# Patient Record
Sex: FEMALE | Race: WHITE | NOT HISPANIC OR LATINO | URBAN - METROPOLITAN AREA
[De-identification: names, ages, dates, MRNs, and addresses within clinical notes are randomized per-mention and may not be internally consistent; named-entity substitution may affect disease eponyms.]

---

## 2022-07-07 ENCOUNTER — INPATIENT (INPATIENT)
Facility: HOSPITAL | Age: 62
LOS: 0 days | Discharge: LEFT BEFORE DISCHARGE | DRG: 552 | End: 2022-07-08
Attending: STUDENT IN AN ORGANIZED HEALTH CARE EDUCATION/TRAINING PROGRAM | Admitting: STUDENT IN AN ORGANIZED HEALTH CARE EDUCATION/TRAINING PROGRAM
Payer: SELF-PAY

## 2022-07-07 VITALS
TEMPERATURE: 99 F | DIASTOLIC BLOOD PRESSURE: 104 MMHG | RESPIRATION RATE: 17 BRPM | OXYGEN SATURATION: 98 % | HEART RATE: 81 BPM | SYSTOLIC BLOOD PRESSURE: 108 MMHG

## 2022-07-07 DIAGNOSIS — R20.0 ANESTHESIA OF SKIN: ICD-10-CM

## 2022-07-07 DIAGNOSIS — Z29.9 ENCOUNTER FOR PROPHYLACTIC MEASURES, UNSPECIFIED: ICD-10-CM

## 2022-07-07 DIAGNOSIS — M54.50 LOW BACK PAIN, UNSPECIFIED: ICD-10-CM

## 2022-07-07 DIAGNOSIS — M48.061 SPINAL STENOSIS, LUMBAR REGION WITHOUT NEUROGENIC CLAUDICATION: ICD-10-CM

## 2022-07-07 LAB
ALBUMIN SERPL ELPH-MCNC: 4.1 G/DL — SIGNIFICANT CHANGE UP (ref 3.4–5)
ALP SERPL-CCNC: 60 U/L — SIGNIFICANT CHANGE UP (ref 40–120)
ALT FLD-CCNC: 31 U/L — SIGNIFICANT CHANGE UP (ref 12–42)
ANION GAP SERPL CALC-SCNC: 10 MMOL/L — SIGNIFICANT CHANGE UP (ref 9–16)
AST SERPL-CCNC: 18 U/L — SIGNIFICANT CHANGE UP (ref 15–37)
BASOPHILS # BLD AUTO: 0.06 K/UL — SIGNIFICANT CHANGE UP (ref 0–0.2)
BASOPHILS NFR BLD AUTO: 0.6 % — SIGNIFICANT CHANGE UP (ref 0–2)
BILIRUB SERPL-MCNC: 0.6 MG/DL — SIGNIFICANT CHANGE UP (ref 0.2–1.2)
BUN SERPL-MCNC: 15 MG/DL — SIGNIFICANT CHANGE UP (ref 7–23)
CALCIUM SERPL-MCNC: 9.1 MG/DL — SIGNIFICANT CHANGE UP (ref 8.5–10.5)
CHLORIDE SERPL-SCNC: 104 MMOL/L — SIGNIFICANT CHANGE UP (ref 96–108)
CO2 SERPL-SCNC: 27 MMOL/L — SIGNIFICANT CHANGE UP (ref 22–31)
CREAT SERPL-MCNC: 0.88 MG/DL — SIGNIFICANT CHANGE UP (ref 0.5–1.3)
EGFR: 75 ML/MIN/1.73M2 — SIGNIFICANT CHANGE UP
EOSINOPHIL # BLD AUTO: 0.06 K/UL — SIGNIFICANT CHANGE UP (ref 0–0.5)
EOSINOPHIL NFR BLD AUTO: 0.6 % — SIGNIFICANT CHANGE UP (ref 0–6)
GLUCOSE BLDC GLUCOMTR-MCNC: 91 MG/DL — SIGNIFICANT CHANGE UP (ref 70–99)
GLUCOSE SERPL-MCNC: 99 MG/DL — SIGNIFICANT CHANGE UP (ref 70–99)
HCT VFR BLD CALC: 41.6 % — SIGNIFICANT CHANGE UP (ref 34.5–45)
HGB BLD-MCNC: 13.8 G/DL — SIGNIFICANT CHANGE UP (ref 11.5–15.5)
IMM GRANULOCYTES NFR BLD AUTO: 0.4 % — SIGNIFICANT CHANGE UP (ref 0–1.5)
LYMPHOCYTES # BLD AUTO: 2.46 K/UL — SIGNIFICANT CHANGE UP (ref 1–3.3)
LYMPHOCYTES # BLD AUTO: 26.1 % — SIGNIFICANT CHANGE UP (ref 13–44)
MCHC RBC-ENTMCNC: 29.9 PG — SIGNIFICANT CHANGE UP (ref 27–34)
MCHC RBC-ENTMCNC: 33.2 GM/DL — SIGNIFICANT CHANGE UP (ref 32–36)
MCV RBC AUTO: 90.2 FL — SIGNIFICANT CHANGE UP (ref 80–100)
MONOCYTES # BLD AUTO: 0.79 K/UL — SIGNIFICANT CHANGE UP (ref 0–0.9)
MONOCYTES NFR BLD AUTO: 8.4 % — SIGNIFICANT CHANGE UP (ref 2–14)
NEUTROPHILS # BLD AUTO: 6.01 K/UL — SIGNIFICANT CHANGE UP (ref 1.8–7.4)
NEUTROPHILS NFR BLD AUTO: 63.9 % — SIGNIFICANT CHANGE UP (ref 43–77)
NRBC # BLD: 0 /100 WBCS — SIGNIFICANT CHANGE UP (ref 0–0)
PLATELET # BLD AUTO: 218 K/UL — SIGNIFICANT CHANGE UP (ref 150–400)
POTASSIUM SERPL-MCNC: 4 MMOL/L — SIGNIFICANT CHANGE UP (ref 3.5–5.3)
POTASSIUM SERPL-SCNC: 4 MMOL/L — SIGNIFICANT CHANGE UP (ref 3.5–5.3)
PROT SERPL-MCNC: 7.7 G/DL — SIGNIFICANT CHANGE UP (ref 6.4–8.2)
RBC # BLD: 4.61 M/UL — SIGNIFICANT CHANGE UP (ref 3.8–5.2)
RBC # FLD: 14.5 % — SIGNIFICANT CHANGE UP (ref 10.3–14.5)
SARS-COV-2 RNA SPEC QL NAA+PROBE: SIGNIFICANT CHANGE UP
SODIUM SERPL-SCNC: 141 MMOL/L — SIGNIFICANT CHANGE UP (ref 132–145)
WBC # BLD: 9.42 K/UL — SIGNIFICANT CHANGE UP (ref 3.8–10.5)
WBC # FLD AUTO: 9.42 K/UL — SIGNIFICANT CHANGE UP (ref 3.8–10.5)

## 2022-07-07 PROCEDURE — 99053 MED SERV 10PM-8AM 24 HR FAC: CPT

## 2022-07-07 PROCEDURE — 99285 EMERGENCY DEPT VISIT HI MDM: CPT

## 2022-07-07 PROCEDURE — 72146 MRI CHEST SPINE W/O DYE: CPT | Mod: 26

## 2022-07-07 PROCEDURE — 72131 CT LUMBAR SPINE W/O DYE: CPT | Mod: 26

## 2022-07-07 PROCEDURE — 70450 CT HEAD/BRAIN W/O DYE: CPT | Mod: 26

## 2022-07-07 PROCEDURE — 99222 1ST HOSP IP/OBS MODERATE 55: CPT

## 2022-07-07 PROCEDURE — 99222 1ST HOSP IP/OBS MODERATE 55: CPT | Mod: GC

## 2022-07-07 PROCEDURE — 72148 MRI LUMBAR SPINE W/O DYE: CPT | Mod: 26

## 2022-07-07 RX ORDER — ACETAMINOPHEN 500 MG
650 TABLET ORAL EVERY 6 HOURS
Refills: 0 | Status: DISCONTINUED | OUTPATIENT
Start: 2022-07-07 | End: 2022-07-08

## 2022-07-07 RX ORDER — OXYCODONE HYDROCHLORIDE 5 MG/1
5 TABLET ORAL EVERY 6 HOURS
Refills: 0 | Status: DISCONTINUED | OUTPATIENT
Start: 2022-07-07 | End: 2022-07-08

## 2022-07-07 RX ORDER — DICLOFENAC SODIUM 75 MG/1
50 TABLET, DELAYED RELEASE ORAL
Refills: 0 | Status: DISCONTINUED | OUTPATIENT
Start: 2022-07-07 | End: 2022-07-08

## 2022-07-07 RX ORDER — LANOLIN ALCOHOL/MO/W.PET/CERES
3 CREAM (GRAM) TOPICAL AT BEDTIME
Refills: 0 | Status: DISCONTINUED | OUTPATIENT
Start: 2022-07-07 | End: 2022-07-08

## 2022-07-07 RX ORDER — ONDANSETRON 8 MG/1
4 TABLET, FILM COATED ORAL EVERY 8 HOURS
Refills: 0 | Status: DISCONTINUED | OUTPATIENT
Start: 2022-07-07 | End: 2022-07-08

## 2022-07-07 RX ORDER — GABAPENTIN 400 MG/1
300 CAPSULE ORAL THREE TIMES A DAY
Refills: 0 | Status: DISCONTINUED | OUTPATIENT
Start: 2022-07-07 | End: 2022-07-08

## 2022-07-07 RX ADMIN — GABAPENTIN 300 MILLIGRAM(S): 400 CAPSULE ORAL at 19:07

## 2022-07-07 NOTE — PATIENT PROFILE ADULT - ARRIVAL FROM
From: Diane Ivory  To: Omar Vega  Sent: 12/14/2020 3:30 PM CST  Subject: Lab Test or Test Related Question    Dr Vega     I have finished both tests ; pulmonary function and CT . Please call me with the results when you get them.     Thank you in advance     Diane Ivory   Home

## 2022-07-07 NOTE — H&P ADULT - ASSESSMENT
is a 62 y/o F who presented to the ED w/ lower back pressure and bilateral lower extremity numbness, who is admitted for rule out of spinal cord compression.

## 2022-07-07 NOTE — H&P ADULT - NSICDXFAMILYHX_GEN_ALL_CORE_FT
FAMILY HISTORY:  Mother  Still living? Unknown  FH: atrial fibrillation, Age at diagnosis: Age Unknown  FH: diabetes mellitus, Age at diagnosis: Age Unknown  FH: hypertension, Age at diagnosis: Age Unknown

## 2022-07-07 NOTE — PATIENT PROFILE ADULT - FALL HARM RISK - HARM RISK INTERVENTIONS
Assistance with ambulation/Assistance OOB with selected safe patient handling equipment/Communicate Risk of Fall with Harm to all staff/Discuss with provider need for PT consult/Monitor gait and stability/Reinforce activity limits and safety measures with patient and family/Tailored Fall Risk Interventions/Use of alarms - bed, chair and/or voice tab/Visual Cue: Yellow wristband and red socks/Bed in lowest position, wheels locked, appropriate side rails in place/Call bell, personal items and telephone in reach/Instruct patient to call for assistance before getting out of bed or chair/Non-slip footwear when patient is out of bed/[place] to call system/Physically safe environment - no spills, clutter or unnecessary equipment/Purposeful Proactive Rounding/Room/bathroom lighting operational, light cord in reach

## 2022-07-07 NOTE — ED PROVIDER NOTE - CLINICAL SUMMARY MEDICAL DECISION MAKING FREE TEXT BOX
60yo F no pmh presents with low back pain and progressively worsening BLE numbness and R > L lower ext weakness.  On exam afebrile, VSS, well appearing, with RLE weakness compared to LLE.  decreased sensation throughout BLE.  Abnormal gait.  TTP throughout lumbar spine.  Nml rectal tone.  Concern for nerve root compression given presentation; plan for basic labs, analgesia, transfer for MRI.

## 2022-07-07 NOTE — CONSULT NOTE ADULT - SUBJECTIVE AND OBJECTIVE BOX
NEUROSURGERY CONSULT NOTE   HISTORY OF PRESENT ILLNESS:   Patient is a 60 y/o F w/ no PMH who originally presented to [facility] with 2 weeks of progressive worsening bilateral LE numbness R>L and lower back pain and right leg weakness. Patient states that numbness began when she woke up 2 weeks ago, denies trauma or previous injury. She also endorses increased difficulty with ambulation. 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 Numbness is worse with ambulation and intermittent and she states that its a decreased sensation but can still feel light touch. Patient had a similar episode 3-4 years ago, which lasted for 2 days and went away on its own.    Of note, patient endorses one episode of bowel incontinence on Saturday which she attributes to a meal she had. She states that she had the urge to go and was walking to bathroom but then realized that she had already defecated.     She denies saddle anesthesia, new onset plegia, urinary incontinence, recent fall or trauma, neck pain, arm weakness.        PAST MEDICAL & SURGICAL HISTORY:  No pertinent past medical history      No significant past surgical history        FAMILY HISTORY:  FH: atrial fibrillation (Mother)    FH: diabetes mellitus (Mother)    FH: hypertension (Mother)      Allergies    No Known Allergies    Intolerances        REVIEW OF SYSTEMS  All negative except above HPI     MEDICATIONS:  Antibiotics:    Neuro:  acetaminophen     Tablet .. 650 milliGRAM(s) Oral every 6 hours PRN  melatonin 3 milliGRAM(s) Oral at bedtime PRN  ondansetron Injectable 4 milliGRAM(s) IV Push every 8 hours PRN    Anticoagulation:    OTHER:  aluminum hydroxide/magnesium hydroxide/simethicone Suspension 30 milliLiter(s) Oral every 4 hours PRN    IVF:      Vital Signs Last 24 Hrs  T(C): 36.6 (07 Jul 2022 16:12), Max: 37.2 (07 Jul 2022 01:09)  T(F): 97.9 (07 Jul 2022 16:12), Max: 98.9 (07 Jul 2022 01:09)  HR: 62 (07 Jul 2022 16:12) (60 - 81)  BP: 152/88 (07 Jul 2022 16:12) (108/104 - 183/97)  BP(mean): 125 (07 Jul 2022 02:14) (125 - 125)  RR: 18 (07 Jul 2022 16:12) (16 - 18)  SpO2: 97% (07 Jul 2022 16:12) (97% - 99%)    Parameters below as of 07 Jul 2022 16:12  Patient On (Oxygen Delivery Method): room air        PHYSICAL EXAM:  Constitutional:  Eyes:  ENMT:  Neck:  Back:  Respiratory:  Cardiovascular:  Gastrointestinal:  Genitourinary:  Rectal:  Vascular:  Neurological:  Skin:    LABS:                        13.8   9.42  )-----------( 218      ( 07 Jul 2022 03:19 )             41.6     07-07    141  |  104  |  15  ----------------------------<  99  4.0   |  27  |  0.88    Ca    9.1      07 Jul 2022 03:19    TPro  7.7  /  Alb  4.1  /  TBili  0.6  /  DBili  x   /  AST  18  /  ALT  31  /  AlkPhos  60  07-07        CULTURES:      RADIOLOGY & ADDITIONAL STUDIES:    Assessment:      Plan:   NEUROSURGERY CONSULT NOTE   HISTORY OF PRESENT ILLNESS:   Patient is a 62 y/o F w/ no PMH who originally presented to [facility] with 2 weeks of progressive worsening bilateral LE numbness R>L, lower back pain, right leg weakness and difficulty with ambulating. Patient states that numbness began when she woke up 2 weeks ago, denies trauma or previous injury. 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 Numbness is worse with ambulation and intermittent and she states that its a decreased sensation but can still feel light touch. Patient had a similar episode 3-4 years ago, which lasted for 2 days and went away on its own.     Of note, patient endorses one episode of bowel incontinence on Saturday which she attributes to a meal she had. She states that she had the urge to go and was walking to bathroom but then realized that she had already defecated.     She denies saddle anesthesia, new onset plegia, urinary incontinence, recent fall or trauma, neck pain, arm weakness.      Neurosurgery consulted for concern for cord compression.     PAST MEDICAL & SURGICAL HISTORY:  No pertinent past medical history      No significant past surgical history      FAMILY HISTORY:  FH: atrial fibrillation (Mother)    FH: diabetes mellitus (Mother)    FH: hypertension (Mother)      Allergies    No Known Allergies    Intolerances      REVIEW OF SYSTEMS  All negative except above HPI     MEDICATIONS:  Antibiotics:    Neuro:  acetaminophen     Tablet .. 650 milliGRAM(s) Oral every 6 hours PRN  melatonin 3 milliGRAM(s) Oral at bedtime PRN  ondansetron Injectable 4 milliGRAM(s) IV Push every 8 hours PRN    Anticoagulation:    OTHER:  aluminum hydroxide/magnesium hydroxide/simethicone Suspension 30 milliLiter(s) Oral every 4 hours PRN    IVF:      Vital Signs Last 24 Hrs  T(C): 36.6 (07 Jul 2022 16:12), Max: 37.2 (07 Jul 2022 01:09)  T(F): 97.9 (07 Jul 2022 16:12), Max: 98.9 (07 Jul 2022 01:09)  HR: 62 (07 Jul 2022 16:12) (60 - 81)  BP: 152/88 (07 Jul 2022 16:12) (108/104 - 183/97)  BP(mean): 125 (07 Jul 2022 02:14) (125 - 125)  RR: 18 (07 Jul 2022 16:12) (16 - 18)  SpO2: 97% (07 Jul 2022 16:12) (97% - 99%)    Parameters below as of 07 Jul 2022 16:12  Patient On (Oxygen Delivery Method): room air      PHYSICAL EXAM:  General: NAD, pt is comfortably sitting up in bed, on room air  HEENT: CN II-XII grossly intact, PERRL 3mm briskly reactive, EOMI b/l, face symmetric, tongue midline, neck FROM  Cardiovascular: RRR, normal S1 and S2   Respiratory: lungs CTAB, no wheezing, rhonchi, or crackles   GI: normoactive BS to auscultation, abd soft, NTND   Neuro: A&Ox3, No aphasia, speech clear, no dysmetria, no pronator drift. Follows commands.  DUNHAM x4 spontaneously, 5/5 strength in all extremities throughout except right HF 4+/5 in comparison to left HF. Decreased sensation throughout bilateral extremities with no specific dermatomal pattern, +extinction. Right patellar 3+, left patellar 2+, no clonus. Negative saddle anesthesia.  Extremities: distal pulses 2+ x4    LABS:                        13.8   9.42  )-----------( 218      ( 07 Jul 2022 03:19 )             41.6     07-07    141  |  104  |  15  ----------------------------<  99  4.0   |  27  |  0.88    Ca    9.1      07 Jul 2022 03:19    TPro  7.7  /  Alb  4.1  /  TBili  0.6  /  DBili  x   /  AST  18  /  ALT  31  /  AlkPhos  60  07-07        CULTURES:      RADIOLOGY & ADDITIONAL STUDIES:  c< from: CT Lumbar Spine No Cont (07.07.22 @ 06:29) >  FINDINGS:    Mild levocurvature of the lumbar spine with apex at the level of L4-L5.   Trace retrolisthesis of L5 on S1. The vertebral body heights are   maintained. There are multilevel degenerative endplate changes with   osteophyte formation, intervertebral disc space narrowing, vacuum   phenomenon, and endplate irregularity. The osseous structures are intact   without fracture.    At the T12-L1 level, there is no disc herniation. There is no spinal   canal stenosis or neural foramen narrowing.    At the L1-2 level, there is a minimal discbulge and degenerative changes   including facet hypertrophy and ligamentum flavum hypertrophy without   significant spinal canal stenosis or neural foraminal narrowing.    At the L2-3 level, there is a disc bulge and degenerative changes   includingfacet hypertrophy and ligamentum flavum hypertrophy   contributing to mild spinal canal stenosis and bilateral neural foraminal   narrowing.    At the L3-4 level, there is a disc bulge with posterior osseous ridging   asymmetric to the right along with facet and ligamenta flava hypertrophy   resulting in moderate spinal canal stenosis and right lateral recess   narrowing with mild to moderate right and mild left neural foraminal   narrowing.    At the L4-5 level, there is a disc bulge with facet and ligamentum flavum   hypertrophy resulting in mild to moderate spinal canal stenosis with   moderate bilateral neural foraminal narrowing    At the L5-S1 level, there is a disc bulge with bilateral facet   hypertrophy and ligamentum flavum hypertrophy contributing to mild spinal   canal stenosis without neural foraminal narrowing.    IMPRESSION:    No acute fracture or malalignment.    Multilevel degenerative changes most prominent at L3-4 with moderate   spinal canal stenosis and right lateralrecess narrowing with mild to   moderate right greater than left neural foraminal narrowing. Correlation   with patient's symptoms is recommended.    < end of copied text >    < from: CT Head No Cont (07.07.22 @ 10:05) >    Impression:    No acute intracranial hemorrhage, mass effect or demarcated territorial   infarction. .    < end of copied text >      Assessment:  Patient is a 62 y/o F w/ no PMH who originally presented to [facility] with 2 weeks of progressive worsening bilateral LE numbness R>L and lower back pain and right leg weakness. Patient states that numbness began when she woke up 2 weeks ago, denies trauma or previous injury now pending additional workup to rule out cord compression    Plan:  - Please obtain MRI thoracic and lumbar without contrast  - Pain control PRN  - Care per primary team    Please call neurosurgery consult phone at [phone] once imaging obtained.     Assessment and plan discussed with Dr. D'Amico.

## 2022-07-07 NOTE — H&P ADULT - NSHPSOCIALHISTORY_GEN_ALL_CORE
The patient lives alone, works 2 jobs, working 60 hours a week usually on her feet all the time. Patient drinks alcohol 1-2 days per week, 2-3 glasses at a time. Denies any drug use or smoking. The patient lives alone, works 2 jobs, working 60 hours a week usually on her feet all the time. Patient drinks alcohol 1-2 days per week, 2-3 glasses at a time. Denies any drug use or smoking. Lives in a 3rd floor walk up building.

## 2022-07-07 NOTE — ED PROVIDER NOTE - PROGRESS NOTE DETAILS
Discussed case w Dr. Burden from medicine and Dr. D'amico from neurosurg.  Plan for CT L spine- if negative, admit to medicine for MRI.  If positive for obvious surgical issue, admit to neurosurg. I spoke with Dr. berman @ 11am via transfer service and discussed head ct. pt accepted for admission. pt is stable in ER . Called transfer center to speak with Dr Page who was not available. Transfer center called back at 11:35 but Dr Page is still not responding. This matter has been escalated by transfer services at this time. Pt    condition is stable at this time.

## 2022-07-07 NOTE — ED PROVIDER NOTE - OBJECTIVE STATEMENT
62yo F no pmh presents with progressively worsening BLE numbness and R > L lower extremity weakness to the point that she can no longer ambulate without bracing herself against a wall.  No fever, chills, nausea, vomiting, or diarrhea.  Feels pressure in low back but no severe back pain.  No hx of cancer.

## 2022-07-07 NOTE — H&P ADULT - NSHPREVIEWOFSYSTEMS_GEN_ALL_CORE
REVIEW OF SYSTEMS:  CONSTITUTIONAL: No weakness, fevers, chills, changes in weight  EYES/ENT: decreased vision in R eye, patient does not have the proper lenses, clogged R ear  NECK: No pain or stiffness  RESPIRATORY: No cough, wheezing, hemoptysis; No shortness of breath  CARDIOVASCULAR: no chest pain or palpitations  GASTROINTESTINAL: No abdominal or epigastric pain. No nausea, vomiting, or hematemesis; No diarrhea or constipation. No melena or hematochezia.  GENITOURINARY: No dysuria, frequency or hematuria  NEUROLOGICAL: Positive for numbness and weakness, positive for 1 episode bowel incontinence  SKIN: No itching, rashes

## 2022-07-07 NOTE — ED PROVIDER NOTE - PHYSICAL EXAMINATION
Constitutional: awake and alert, in no acute distress  HEENT: head normocephalic and atraumatic. moist mucous membranes  Eyes: extraocular movements intact, normal conjunctiva  Neck: supple, normal ROM  Cardiovascular: regular rate   Pulmonary: no respiratory distress  Gastrointestinal: abdomen flat and nondistended  Rectal: nml tone  Skin: warm, dry, normal for ethnicity  Musculoskeletal: TTP throughout lumbar spine, no edema, no deformity. DP pulses palpable b/l.  Neurological: oriented x4.  RLE strength 4/5 at hip flexion, knee flex/ ext, ankle dorsiflexion.  decreased sensation throughout BLE.  unsteady gait.    Psychiatric: calm and cooperative

## 2022-07-07 NOTE — H&P ADULT - HISTORY OF PRESENT ILLNESS
is a 62 y/o F w/ no PMH who presented to the ED w/ 2 weeks of bilateral LE numbness R>L and lower back pain. Patient states that numbness began when she woke up 2 weeks ago, no inciting events. Numbness characterized by decreased sensation and tingling, moves throughout the entire lower extremities bilaterally, including through the feet. Patient also has lower back pain that she characterizes as a pressure. Numbness is worse with ambulation, comes and goes. Patient had a similar episode 3-4 years ago, which lasted for 2 days and went away on its own. Of note, patient had 1 episode of bowel incontinence on Saturday, where she ate a bad salad, felt that she had to go to the bathroom so tried to get there, but discovered that she had already gone. Patient did not feel herself going to the bathroom during that episode, but usually can feel herself going to the bathroom.     In the ED, vitals include /104, HR 81/min, RR 17/min, O2 sat 98% RA, Temp (F) 98.9. Patient did not receive any fluids or medication. Patient received CT lumbar spine and CT head. Neurosurgery was consulted in ED and recommended admission for MRI evaluation.

## 2022-07-07 NOTE — H&P ADULT - ATTENDING COMMENTS
61F w episode of R>LE weakness several years ago (resolved in 2d), p/w 2wk progressive LBP, BLE numbness, R>LE weakness leading to difficulty ambulating - admitted for eval for cord compression, lumbar radiculopathy, CT lumbar spine shows spinal canal stenosis and L3-4 foraminal narrowing    #R leg weakness - ddx includes lumbar radiculopathy, disk herniation vs neuropathy. MS lower on ddx.   #Ambulatory dysfunction    Plan  Overall, pt has had progressive sacraliliac pain w R>L leg weakness accompanied by numbness throughout the entire leg. No trauma, injury, prior surgeries. Denies saddle anesthesia, change in bowel/bladder habits. Exam notable for 4/5 in R hip flexion. 4/5 in great toe extension. 5/5 in b/l plantarflex/ext. nml sensation b/l but decreased proprioception and vibratory sensation R>L. 3+ in R patella; 2+ in L patella. 2+ in b/l achilles. Nml heel-to-shin - pt is ambulating wo assistance but reports numbness in feet as primary limitation. CT Head negative for acute processes.   MR T+L spine wo contrast  F/U NSG recs  Neuro checks  Start gabapentin 300mg TID; monitor for improvement in pain. May need PT eval in AM    DISPO: Home pending , NSGEOVANNY final recs. Lives in 3 floor walk-up  CODE: FULL. PPx: Pt is ambulatory  Rest per Dr. Darling

## 2022-07-07 NOTE — ED ADULT NURSE NOTE - CHPI ED NUR SYMPTOMS NEG
no change in level of consciousness/no confusion/no dizziness/no fever/no loss of consciousness/no nausea

## 2022-07-07 NOTE — H&P ADULT - PROBLEM SELECTOR PLAN 3
F: PO intake  E: PO intake  N: regular diet  D: not indicated  Dispo: admit to [facility] F: PO intake  E: PO intake  N: regular diet  D: SCDs  Dispo: admit to [facility]. At d/c consider PT and OT consult, set patient up with PCP and PM&R Dr. Bayron Nair    Full code.

## 2022-07-07 NOTE — H&P ADULT - NSHPLABSRESULTS_GEN_ALL_CORE
LABS:                         13.8   9.42  )-----------( 218      ( 07 Jul 2022 03:19 )             41.6     07-07    141  |  104  |  15  ----------------------------<  99  4.0   |  27  |  0.88    Ca    9.1      07 Jul 2022 03:19    TPro  7.7  /  Alb  4.1  /  TBili  0.6  /  DBili  x   /  AST  18  /  ALT  31  /  AlkPhos  60  07-07                  RADIOLOGY, EKG & ADDITIONAL TESTS: Reviewed.

## 2022-07-07 NOTE — ED ADULT NURSE REASSESSMENT NOTE - NS ED NURSE REASSESS COMMENT FT1
Pt endorsed to me in NAD, has no current c/o pain, but states she is still having a lot of weakness and back pain when walking.

## 2022-07-07 NOTE — ED ADULT TRIAGE NOTE - CHIEF COMPLAINT QUOTE
Pt c/o lower back pressure radiating down BLE's w/ BLE numbness and worsening vision x2 weeks. Pt endorses hx of sciatica and states "I don't have my new contacts so that may be why my vision has been off the past few weeks".

## 2022-07-07 NOTE — H&P ADULT - PROBLEM SELECTOR PLAN 2
Mild lower back pain/pressure, worse with movement    - tylenol q6 PRN for mild pain control  - diclofenac 50 mg BID PRN for moderate pain  - IV tramadol for severe pain Mild lower back pain/pressure, worse with movement    - tylenol q6 PRN for mild pain control  - diclofenac 50 mg BID PRN for moderate pain  - oxycodone IR 5 mg for severe pain

## 2022-07-07 NOTE — H&P ADULT - NSHPPHYSICALEXAM_GEN_ALL_CORE
VITAL SIGNS:  T(C): 36.6 (07-07-22 @ 12:52), Max: 37.2 (07-07-22 @ 01:09)  T(F): 97.9 (07-07-22 @ 12:52), Max: 98.9 (07-07-22 @ 01:09)  HR: 65 (07-07-22 @ 12:52) (60 - 81)  BP: 171/95 (07-07-22 @ 12:52) (108/104 - 183/97)  BP(mean): 125 (07-07-22 @ 02:14) (125 - 125)  RR: 18 (07-07-22 @ 12:52) (16 - 18)  SpO2: 99% (07-07-22 @ 12:52) (97% - 99%)  Wt(kg): --    PHYSICAL EXAM:  Constitutional: WDWN resting comfortably in bed; NAD  Head: NC/AT  Eyes: PER, EOMI, anicteric sclera  ENT: no nasal discharge  Respiratory: CTA B/L; no W/R/R, no retractions  Cardiac: +S1/S2; RRR; no M/R/G  Gastrointestinal: abdomen soft, NT/ND; no rebound or guarding  Back: spine midline, bony tenderness L3-L5  Extremities: WWP, no clubbing or cyanosis; no peripheral edema  Musculoskeletal: NROM x4, all upper extremity strength 5/5, lower extremity strength: R hip flexion and extension 3/5, R knee flexion and extension 4/5, R ankle dorsiflexion and plantarflexion 4/5, L extremity all 5/5. walking with a shuffling, unsteady gait. point tenderness R sacroiliac joint, but no radiation down the leg  Dermatologic: skin warm, dry and intact; no rashes, wounds, or scars  Neurologic: AAOx3; reflexes: R patellar 3+, L patellar 2+, R achilles 1+, L achilles 2+  Psychiatric: affect and characteristics of appearance, verbalizations, behaviors are appropriate VITAL SIGNS:  T(C): 36.6 (07-07-22 @ 12:52), Max: 37.2 (07-07-22 @ 01:09)  T(F): 97.9 (07-07-22 @ 12:52), Max: 98.9 (07-07-22 @ 01:09)  HR: 65 (07-07-22 @ 12:52) (60 - 81)  BP: 171/95 (07-07-22 @ 12:52) (108/104 - 183/97)  BP(mean): 125 (07-07-22 @ 02:14) (125 - 125)  RR: 18 (07-07-22 @ 12:52) (16 - 18)  SpO2: 99% (07-07-22 @ 12:52) (97% - 99%)  Wt(kg): --    PHYSICAL EXAM:  Constitutional: WDWN resting comfortably in bed; NAD  Head: NC/AT  Eyes: PER, EOMI, anicteric sclera  ENT: no nasal discharge  Respiratory: CTA B/L; no W/R/R, no retractions  Cardiac: +S1/S2; RRR; no M/R/G  Gastrointestinal: abdomen soft, NT/ND; no rebound or guarding  Back: spine midline, bony tenderness L3-L5  Extremities: WWP, no clubbing or cyanosis; no peripheral edema  Musculoskeletal: NROM x4  Dermatologic: skin warm, dry and intact; no rashes, wounds, or scars  Neurologic: AAOx3; reflexes: R patellar 3+, L patellar 2+, R achilles 1+, L achilles 2+. all upper extremity strength 5/5, lower extremity strength: R hip flexion and extension 3/5, R knee flexion and extension 4/5, R ankle dorsiflexion and plantarflexion 4/5, L extremity all 5/5. walking with a shuffling, unsteady gait. point tenderness R sacroiliac joint, but no radiation down the leg. no vibration sense from the knees down bilaterally, decreased proprioception in toes bilaterally. normal sensation to fine touch  Psychiatric: affect and characteristics of appearance, verbalizations, behaviors are appropriate

## 2022-07-07 NOTE — ED ADULT NURSE NOTE - OBJECTIVE STATEMENT
lower back pain/ pressure radiating down BLE's with numbness and worsening vision x2 weeks- states does not have contacts. Pt endorses hx of sciatica

## 2022-07-07 NOTE — ED PROVIDER NOTE - NS ED ROS FT
Constitutional:  No fever, No chills, No night sweats  Eyes:  No visual changes, No discharge, No redness  ENMT:  No epistaxis, no nasal congestion, no throat pain, no difficulty swallowing  CV:  No chest pain, No palpitations, No peripheral edema  Resp:  No cough, No shortness of breath  GI:  No abdominal pain, No vomiting, No diarrhea  MSK:  No neck pain or stiffness, No joint swelling or pain, +back pain  Neuro: no loss of consciousness, no gait abnormality, no headache, +sensory deficits, and +weakness.  Skin:  No abrasions, no lesions, no rashes  Psych:  No known mental health issues

## 2022-07-08 ENCOUNTER — TRANSCRIPTION ENCOUNTER (OUTPATIENT)
Age: 62
End: 2022-07-08

## 2022-07-08 VITALS
TEMPERATURE: 98 F | SYSTOLIC BLOOD PRESSURE: 150 MMHG | DIASTOLIC BLOOD PRESSURE: 79 MMHG | OXYGEN SATURATION: 96 % | HEART RATE: 70 BPM | RESPIRATION RATE: 18 BRPM

## 2022-07-08 PROBLEM — Z00.00 ENCOUNTER FOR PREVENTIVE HEALTH EXAMINATION: Status: ACTIVE | Noted: 2022-07-08

## 2022-07-08 LAB
ANION GAP SERPL CALC-SCNC: 11 MMOL/L — SIGNIFICANT CHANGE UP (ref 5–17)
BUN SERPL-MCNC: 11 MG/DL — SIGNIFICANT CHANGE UP (ref 7–23)
CALCIUM SERPL-MCNC: 8.9 MG/DL — SIGNIFICANT CHANGE UP (ref 8.4–10.5)
CHLORIDE SERPL-SCNC: 104 MMOL/L — SIGNIFICANT CHANGE UP (ref 96–108)
CO2 SERPL-SCNC: 26 MMOL/L — SIGNIFICANT CHANGE UP (ref 22–31)
CREAT SERPL-MCNC: 0.79 MG/DL — SIGNIFICANT CHANGE UP (ref 0.5–1.3)
EGFR: 85 ML/MIN/1.73M2 — SIGNIFICANT CHANGE UP
GLUCOSE SERPL-MCNC: 100 MG/DL — HIGH (ref 70–99)
HCT VFR BLD CALC: 42.1 % — SIGNIFICANT CHANGE UP (ref 34.5–45)
HCV AB S/CO SERPL IA: 0.04 S/CO — SIGNIFICANT CHANGE UP
HCV AB SERPL-IMP: SIGNIFICANT CHANGE UP
HGB BLD-MCNC: 13.9 G/DL — SIGNIFICANT CHANGE UP (ref 11.5–15.5)
MCHC RBC-ENTMCNC: 30 PG — SIGNIFICANT CHANGE UP (ref 27–34)
MCHC RBC-ENTMCNC: 33 GM/DL — SIGNIFICANT CHANGE UP (ref 32–36)
MCV RBC AUTO: 90.7 FL — SIGNIFICANT CHANGE UP (ref 80–100)
NRBC # BLD: 0 /100 WBCS — SIGNIFICANT CHANGE UP (ref 0–0)
PLATELET # BLD AUTO: 221 K/UL — SIGNIFICANT CHANGE UP (ref 150–400)
POTASSIUM SERPL-MCNC: 3.6 MMOL/L — SIGNIFICANT CHANGE UP (ref 3.5–5.3)
POTASSIUM SERPL-SCNC: 3.6 MMOL/L — SIGNIFICANT CHANGE UP (ref 3.5–5.3)
RBC # BLD: 4.64 M/UL — SIGNIFICANT CHANGE UP (ref 3.8–5.2)
RBC # FLD: 14.6 % — HIGH (ref 10.3–14.5)
SODIUM SERPL-SCNC: 141 MMOL/L — SIGNIFICANT CHANGE UP (ref 135–145)
WBC # BLD: 6.7 K/UL — SIGNIFICANT CHANGE UP (ref 3.8–10.5)
WBC # FLD AUTO: 6.7 K/UL — SIGNIFICANT CHANGE UP (ref 3.8–10.5)

## 2022-07-08 PROCEDURE — 99232 SBSQ HOSP IP/OBS MODERATE 35: CPT

## 2022-07-08 PROCEDURE — 80053 COMPREHEN METABOLIC PANEL: CPT

## 2022-07-08 PROCEDURE — 82962 GLUCOSE BLOOD TEST: CPT

## 2022-07-08 PROCEDURE — 99239 HOSP IP/OBS DSCHRG MGMT >30: CPT | Mod: GC

## 2022-07-08 PROCEDURE — 86803 HEPATITIS C AB TEST: CPT

## 2022-07-08 PROCEDURE — 80048 BASIC METABOLIC PNL TOTAL CA: CPT

## 2022-07-08 PROCEDURE — 72146 MRI CHEST SPINE W/O DYE: CPT

## 2022-07-08 PROCEDURE — 87635 SARS-COV-2 COVID-19 AMP PRB: CPT

## 2022-07-08 PROCEDURE — 36415 COLL VENOUS BLD VENIPUNCTURE: CPT

## 2022-07-08 PROCEDURE — 85027 COMPLETE CBC AUTOMATED: CPT

## 2022-07-08 PROCEDURE — 85025 COMPLETE CBC W/AUTO DIFF WBC: CPT

## 2022-07-08 PROCEDURE — 70450 CT HEAD/BRAIN W/O DYE: CPT

## 2022-07-08 PROCEDURE — 99285 EMERGENCY DEPT VISIT HI MDM: CPT | Mod: 25

## 2022-07-08 PROCEDURE — 99223 1ST HOSP IP/OBS HIGH 75: CPT

## 2022-07-08 PROCEDURE — 72148 MRI LUMBAR SPINE W/O DYE: CPT

## 2022-07-08 PROCEDURE — 72131 CT LUMBAR SPINE W/O DYE: CPT

## 2022-07-08 RX ORDER — GABAPENTIN 400 MG/1
1 CAPSULE ORAL
Qty: 0 | Refills: 0 | DISCHARGE
Start: 2022-07-08

## 2022-07-08 RX ADMIN — Medication 650 MILLIGRAM(S): at 03:58

## 2022-07-08 RX ADMIN — Medication 650 MILLIGRAM(S): at 04:58

## 2022-07-08 NOTE — DISCHARGE NOTE PROVIDER - ATTENDING DISCHARGE PHYSICAL EXAMINATION:
I have read and agree with the resident Discharge Note above.  Patient seen and discussed with resident team on the day of discharge.     Briefly, 62 y/o F w/ no PMH who presented to the ED w/ 2 weeks of bilateral LE numbness R>L and R hip weakness.  No cord compression on MRI L spine but MRI T spine concernign for T2-T3 demylinating process vs syrinx.  Neurology consulted given MR finding and prior episode of weakness concerning for MS vs demylinating process and plan for MR brain andMR whole spine with contrast to diagnose and determine need for treatment.  Patient refused remainder of workup and left AMA.   Able to demonstrate capacity and understands the risk of worsening neurologic condition, scheduled for neuro outpatient follow up and stressed importance of follow up    Attending exam on day of discharge:   Gen: NAD, lying comfortably in bed  HEENT: NCAT, MMM, clear OP  Neck: supple, trachea at midline  CV: RRR, no m/g/r appreciated  Pulm: CTA B, no w/r/r, normal work of breathing  Abd: +BS, soft, NTND  : deferred  Skin: no rashes, ulcers, jaundice; intact, warm and dry  Ext: WWP, no c/c/e  MSK: 5/5 strength b/l UEs, 4/5 R hip flexion 5/5 L hip flexion and decreased sensation in b/l LEs, normal range of motion, no swollen or erythematous joints  Neuro: AOx3, no change from baseline  Psych: pleasant, conversant and appropriate    I was physically present for the evaluation and management services provided. I agree with the included history, physical, and plan which I reviewed and edited where appropriate. I spent > 30 minutes with the patient and the patient's family on direct patient care and discharge planning with more than 50% of the visit spent on counseling and/or coordination of care.     Nadir Rojas  [phone]

## 2022-07-08 NOTE — PROGRESS NOTE ADULT - PROBLEM SELECTOR PLAN 1
CT lumbar spine significant for spinal stenosis L3-L4 and degenerative changes, however patient's symptoms are not localized to a specific spinal cord level. Concern for multilevel spinal stenosis, need to rule out cauda equina and spinal cord compression due to episode of bowel incontinence. Other things lower on the differential to be considered include multiple sclerosis, transverse myelitis.     - MRI w/o contrast thoracic and lumbar spine  - consulted neurosurgery, appreciate recs  - gabapentin 300 mg tonight, see how patient tolerates, continue gabapentin 300 mg TID  - serial neuro exams q8, observe for any acute changes to neuro exam  - for spinal stenosis after r/o of cord compression, consider antiinflammatory treatment such as steroid course, gabapentin 300 mg TID, and outpatient PT referral  - if MRI negative, consider outpatient neurology work up

## 2022-07-08 NOTE — DISCHARGE NOTE PROVIDER - CARE PROVIDER_API CALL
D'amico, Randy  [address] [place], [place] [address]  Phone: [phone]  Fax: (   )    -  Follow Up Time: 1 week   Park iHllman)  Neurology  [address] [place] [address]  Phone: [phone]  Fax: [phone]  Scheduled Appointment: 09/07/2022 02:00 PM

## 2022-07-08 NOTE — DISCHARGE NOTE PROVIDER - NSDCCPTREATMENT_GEN_ALL_CORE_FT
PRINCIPAL PROCEDURE  Procedure: MRI thoracic spine  Findings and Treatment: The MRI of your thoracic spine showed an area of abnormal signaling called a "demyelinating plaque." This is something you should follow up with your neurosurgery team.         SECONDARY PROCEDURE  Procedure: MRI lumbar spine wo contrast  Findings and Treatment: The MRI of your lumbar spine (lower back) showed degenerative changes most prominent at L3-4 with some narrowing of the canal where the nerves exit the spine. This may correlate with the weakness and numbness you're experiencing.    Procedure: CT head wo con  Findings and Treatment: The CT scan we did of your head showed that there was no problem with your brain or skull.    Procedure: CT lumbar spine wo con  Findings and Treatment: The CT scan we did on your lower back showed degenerative changes most prominent at L3-4 with some narrowing of the canal where the nerves exit the spine. This may correlate with the weakness and numbness you're experiencing.     PRINCIPAL PROCEDURE  Procedure: MRI thoracic spine  Findings and Treatment: The MRI of your thoracic spine showed an area of abnormal signaling called a "demyelinating plaque." This is something you should follow up with a neurologist for.      SECONDARY PROCEDURE  Procedure: MRI lumbar spine wo contrast  Findings and Treatment: The MRI of your lumbar spine (lower back) showed degenerative changes most prominent at L3-4 with some narrowing of the canal where the nerves exit the spine. This may correlate with the weakness and numbness you're experiencing. You should follow up with a neurologist to discuss this further.    Procedure: CT head wo con  Findings and Treatment: The CT scan we did of your head showed that there was no problem with your brain or skull.    Procedure: CT lumbar spine wo con  Findings and Treatment: The CT scan we did on your lower back showed degenerative changes most prominent at L3-4 with some narrowing of the canal where the nerves exit the spine. This may correlate with the weakness and numbness you're experiencing.

## 2022-07-08 NOTE — DISCHARGE NOTE NURSING/CASE MANAGEMENT/SOCIAL WORK - NSDCFUADDAPPT_GEN_ALL_CORE_FT
Please follow up with Dr. Hillman from neurology about the results of your scans. We have arranged an appointment for you. Your appointment is on September 7th at 2pm with Dr. Hillman. We would like you to see him within 1-2 weeks of discharge, the office staff will call you to arrange for a possible earlier appoinment.

## 2022-07-08 NOTE — CONSULT NOTE ADULT - SUBJECTIVE AND OBJECTIVE BOX
Neurology consult    SHEELA LY61yFemale    HPI:   is a 62 y/o F w/ no PMH who presented to the ED w/ 2 weeks of bilateral LE numbness R>L and lower back pain. Patient states that numbness began when she woke up 2 weeks ago, no inciting events. Numbness characterized by decreased sensation and tingling, moves throughout the entire lower extremities bilaterally, including through the feet. Patient also has lower back pain that she characterizes as a pressure. Numbness is worse with ambulation, comes and goes. Patient had a similar episode 3-4 years ago, which lasted for 2 days and went away on its own. Of note, patient had 1 episode of bowel incontinence on Saturday, where she ate a bad salad, felt that she had to go to the bathroom so tried to get there, but discovered that she had already gone. Patient did not feel herself going to the bathroom during that episode, but usually can feel herself going to the bathroom.   MEDICATIONS    acetaminophen     Tablet .. 650 milliGRAM(s) Oral every 6 hours PRN  aluminum hydroxide/magnesium hydroxide/simethicone Suspension 30 milliLiter(s) Oral every 4 hours PRN  diclofenac 50 milliGRAM(s) Oral two times a day PRN  gabapentin 300 milliGRAM(s) Oral three times a day  melatonin 3 milliGRAM(s) Oral at bedtime PRN  ondansetron Injectable 4 milliGRAM(s) IV Push every 8 hours PRN  oxyCODONE    IR 5 milliGRAM(s) Oral every 6 hours PRN         Family history: No history of dementia, strokes, or seizures   FAMILY HISTORY:  FH: atrial fibrillation (Mother)    FH: diabetes mellitus (Mother)    FH: hypertension (Mother)      SOCIAL HISTORY -- No history of tobacco or alcohol use     Allergies    No Known Allergies    Intolerances            Vital Signs Last 24 Hrs  T(C): 36.8 (08 Jul 2022 10:07), Max: 36.8 (08 Jul 2022 06:30)  T(F): 98.2 (08 Jul 2022 10:07), Max: 98.2 (08 Jul 2022 06:30)  HR: 70 (08 Jul 2022 10:07) (62 - 71)  BP: 150/79 (08 Jul 2022 10:07) (150/79 - 174/96)  BP(mean): --  RR: 18 (08 Jul 2022 10:07) (17 - 18)  SpO2: 96% (08 Jul 2022 10:07) (96% - 99%)    Parameters below as of 08 Jul 2022 10:07  Patient On (Oxygen Delivery Method): room air          REVIEW OF SYSTEMS:    Constitutional: No fever, chills, fatigue, weakness  Eyes: no eye pain, visual disturbances, or discharge  ENT:  No difficulty hearing, tinnitus, vertigo; No sinus or throat pain  Neck: No pain or stiffness  Respiratory: No cough, dyspnea, wheezing   Cardiovascular: No chest pain, palpitations,   Gastrointestinal: No abdominal or epigastric pain. No nausea, vomiting  No diarrhea or constipation.   Genitourinary: No dysuria, frequency, hematuria or incontinence  Neurological: No headaches, lightheadedness, vertigo, numbness or tremors  Psychiatric: No depression, anxiety, mood swings or difficulty sleeping  Musculoskeletal: No joint pain or swelling; No muscle, back or extremity pain  Skin: No itching, burning, rashes or lesions   Lymph Nodes: No enlarged glands  Endocrine: No heat or cold intolerance; No hair loss, No h/o diabetes or thyroid dysfunction  Allergy and Immunologic: No hives or eczema    On Neurological Examination:    Mental Status - Patient is alert, awake, oriented X3.   Follows commands well and able to answer questions appropriately. Mood and affect  normal  Speech -   Fluent                         Cranial Nerves - Pupils 3 mm equal and reactive to light,   extraocular eye movements intact.   Motor Exam -   Right upper 5/5  Left upper 5/5  Right lower 4/5 proximal and distal  Left lower 5/5   RLE Hyperreflexia in patella+3, achilles   LLE reflexes +2  RUE reflexs +2  LUE reflexes +2    With stimuli positive movement of all 4 extremities  Muscle tone - is normal all over. No asymmetry is seen.    Sensory  diminished vibration sense from the knees down bilaterally, decreased proprioception in toes bilaterally. normal sensation to fine touch  Gait -  unsteady narrow stance on heels     GENERAL Exam:No Acute Distress   HEENT:  normocephalic, atraumatic  LUNGS:	Clear bilaterally  No Wheeze  Decreased bilaterally  HEART:	 Normal S1S2   No murmur RRR        GI/ ABDOMEN:  Soft  Non tender  EXTREMITIES:   No Edema  No Clubbing  No Cyanosis No Edema  MUSCULOSKELETAL: Normal Range of Motion  SKIN:      Normal   No Ecchymosis               LABS:  CBC Full  -  ( 08 Jul 2022 08:16 )  WBC Count : 6.70 K/uL  RBC Count : 4.64 M/uL  Hemoglobin : 13.9 g/dL  Hematocrit : 42.1 %  Platelet Count - Automated : 221 K/uL  Mean Cell Volume : 90.7 fl  Mean Cell Hemoglobin : 30.0 pg  Mean Cell Hemoglobin Concentration : 33.0 gm/dL  Auto Neutrophil # : x  Auto Lymphocyte # : x  Auto Monocyte # : x  Auto Eosinophil # : x  Auto Basophil # : x  Auto Neutrophil % : x  Auto Lymphocyte % : x  Auto Monocyte % : x  Auto Eosinophil % : x  Auto Basophil % : x      07-08    141  |  104  |  11  ----------------------------<  100<H>  3.6   |  26  |  0.79    Ca    8.9      08 Jul 2022 08:16    TPro  7.7  /  Alb  4.1  /  TBili  0.6  /  DBili  x   /  AST  18  /  ALT  31  /  AlkPhos  60  07-07    Hemoglobin A1C:     LIVER FUNCTIONS - ( 07 Jul 2022 03:19 )  Alb: 4.1 g/dL / Pro: 7.7 g/dL / ALK PHOS: 60 U/L / ALT: 31 U/L / AST: 18 U/L / GGT: x           Vitamin B12         RADIOLOGY    EKG

## 2022-07-08 NOTE — CONSULT NOTE ADULT - ASSESSMENT
61F w/ no PMH who originally presented to [facility] with 2 weeks of progressive worsening bilateral LE numbness R>L and lower back pain and right leg weakness. Admitted w/u of cord compression, lumbar radiculopathy, CT lumbar spine shows spinal canal stenosis and L3-4 foraminal narrowing. Neuorlogy consulted for MR find of plaque T2-3    Assessment    Plan

## 2022-07-08 NOTE — DISCHARGE NOTE PROVIDER - NSDCFUADDAPPT_GEN_ALL_CORE_FT
Please follow up with neurosurgery about the results of your scans. The neurosurgeon who saw you in the hospital was Dr. Randy D'Amico. We have arranged an appointment for you.  His office phone number is [phone]. We would like you to see him within 1-2 weeks of discharge Please follow up with Dr. Hillman from neurology about the results of your scans. We have arranged an appointment for you. Your appointment is on September 7th at 2pm with Dr. Hillman. We would like you to see him within 1-2 weeks of discharge. Please follow up with Dr. Hillman from neurology about the results of your scans. We have arranged an appointment for you. Your appointment is on September 7th at 2pm with Dr. Hillman. We would like you to see him within 1-2 weeks of discharge, the office staff will call you to arrange for a possible earlier appoinment.

## 2022-07-08 NOTE — CHART NOTE - NSCHARTNOTEFT_GEN_A_CORE
Pt was informed that she requires further imaging to make a diagnosis and determine appropriate medical management, however, she would not like to remain in the hospital past 1pm today as she has to go to work. The patient was informed that she risks further progression of potential underlying neurological disease. The patient stated that she understood this risk, however is adamant about leaving because she cannot spend any more time in the hospital.

## 2022-07-08 NOTE — PROGRESS NOTE ADULT - PROBLEM SELECTOR PLAN 3
F: PO intake  E: PO intake  N: regular diet  D: SCDs  Dispo: admit to [facility]. At d/c consider PT and OT consult, set patient up with PCP and PM&R Dr. Bayron Nair    Full code.

## 2022-07-08 NOTE — PROGRESS NOTE ADULT - SUBJECTIVE AND OBJECTIVE BOX
SHEELA LY, 61y, Female  MRN-[id number]  Patient is a 61y old  Female who presents with a chief complaint of numbness (08 Jul 2022 10:35)    OVERNIGHT EVENTS: No acute events.    SUBJECTIVE: Ms. Ly was seen and evaluated at bedside this morning. She denied blurry and double vision, new numbness and weakness, shortness of breath, chest pain, fever/chills, difficulty with chewing and swallowing, further episodes of bladder/bowel incontinence. She stated she was eager to leave and that she would be leaving at 1pm regardless of our recommendations.     12 Point ROS Negative unless noted otherwise above.  -------------------------------------------------------------------------------  VITAL SIGNS:  Vital Signs Last 24 Hrs  T(C): 36.8 (08 Jul 2022 10:07), Max: 36.8 (08 Jul 2022 06:30)  T(F): 98.2 (08 Jul 2022 10:07), Max: 98.2 (08 Jul 2022 06:30)  HR: 70 (08 Jul 2022 10:07) (62 - 71)  BP: 150/79 (08 Jul 2022 10:07) (150/79 - 174/96)  BP(mean): --  RR: 18 (08 Jul 2022 10:07) (17 - 18)  SpO2: 96% (08 Jul 2022 10:07) (96% - 99%)    Parameters below as of 08 Jul 2022 10:07  Patient On (Oxygen Delivery Method): room air    PHYSICAL EXAM:  General: NAD, resting comfortably in bed   HEENT: NC/AT; EOMI, anicteric sclera; moist mucosal membranes.  Neck: supple, trachea midline  Cardiovascular: RRR, +S1/S2; NO M/R/G  Respiratory: CTA B/L; no W/R/R  Gastrointestinal: soft, NT/ND; +BSx4  Extremities: WWP; no edema or cyanosis  Vascular: 2+ radial, DP/PT pulses B/L  Neurological: AAOx3; patient declined neuro exam    ALLERGIES:  No Known Allergies    MEDICATIONS  (STANDING):  gabapentin 300 milliGRAM(s) Oral three times a day    MEDICATIONS  (PRN):  acetaminophen     Tablet .. 650 milliGRAM(s) Oral every 6 hours PRN Temp greater or equal to 38C (100.4F), Mild Pain (1 - 3)  aluminum hydroxide/magnesium hydroxide/simethicone Suspension 30 milliLiter(s) Oral every 4 hours PRN Dyspepsia  diclofenac 50 milliGRAM(s) Oral two times a day PRN Moderate Pain (4 - 6)  melatonin 3 milliGRAM(s) Oral at bedtime PRN Insomnia  ondansetron Injectable 4 milliGRAM(s) IV Push every 8 hours PRN Nausea and/or Vomiting  oxyCODONE    IR 5 milliGRAM(s) Oral every 6 hours PRN Severe Pain (7 - 10)  -------------------------------------------------------------------------------  LABS:                        13.9   6.70  )-----------( 221      ( 08 Jul 2022 08:16 )             42.1     07-08    141  |  104  |  11  ----------------------------<  100<H>  3.6   |  26  |  0.79    Ca    8.9      08 Jul 2022 08:16    TPro  7.7  /  Alb  4.1  /  TBili  0.6  /  DBili  x   /  AST  18  /  ALT  31  /  AlkPhos  60  07-07    LIVER FUNCTIONS - ( 07 Jul 2022 03:19 )  Alb: 4.1 g/dL / Pro: 7.7 g/dL / ALK PHOS: 60 U/L / ALT: 31 U/L / AST: 18 U/L / GGT: x               CAPILLARY BLOOD GLUCOSE  POCT Blood Glucose.: 91 mg/dL (07 Jul 2022 01:48)    COVID-19 PCR: NotDetec (07 Jul 2022 03:19)  RADIOLOGY & ADDITIONAL TESTS: Reviewed.

## 2022-07-08 NOTE — DISCHARGE NOTE PROVIDER - NSDCFUSCHEDAPPT_GEN_ALL_CORE_FT
Park Hillman  [facility]  NEUROLOGY [address]  Scheduled Appointment: 09/07/2022

## 2022-07-08 NOTE — DISCHARGE NOTE NURSING/CASE MANAGEMENT/SOCIAL WORK - PATIENT PORTAL LINK FT
You can access the FollowMyHealth Patient Portal offered by [facility] by registering at the following website: [email or web address]. By joining [employer]’s FollowMyHealth portal, you will also be able to view your health information using other applications (apps) compatible with our system.

## 2022-07-08 NOTE — DISCHARGE NOTE PROVIDER - PROVIDER TOKENS
FREE:[LAST:[SHAWN'amico],FIRST:[Damien],PHONE:[phone]],FAX:[(   [phone]    -],ADDRESS:[[address] [place] [address]],FOLLOWUP:[1 week]] PROVIDER:[TOKEN:[[id number]],SCHEDULEDAPPT:[09/07/2022],SCHEDULEDAPPTTIME:[02:00 PM]]

## 2022-07-08 NOTE — DISCHARGE NOTE PROVIDER - CARE PROVIDERS DIRECT ADDRESSES
,DirectAddress_Unknown ,[email or web address]

## 2022-07-08 NOTE — PROGRESS NOTE ADULT - SUBJECTIVE AND OBJECTIVE BOX
HPI:  ROSSY is a 60 y/o F w/ no PMH who presented to the ED w/ 2 weeks of bilateral LE numbness R>L and lower back pain. Patient states that numbness began when she woke up 2 weeks ago, no inciting events. Numbness characterized by decreased sensation and tingling, moves throughout the entire lower extremities bilaterally, including through the feet. Patient also has lower back pain that she characterizes as a pressure. Numbness is worse with ambulation, comes and goes. Patient had a similar episode 3-4 years ago, which lasted for 2 days and went away on its own. Of note, patient had 1 episode of bowel incontinence on Saturday, where she ate a bad salad, felt that she had to go to the bathroom so tried to get there, but discovered that she had already gone. Patient did not feel herself going to the bathroom during that episode, but usually can feel herself going to the bathroom.     In the ED, vitals include /104, HR 81/min, RR 17/min, O2 sat 98% RA, Temp (F) 98.9. Patient did not receive any fluids or medication. Patient received CT lumbar spine and CT head. Neurosurgery was consulted in ED and recommended admission for MRI evaluation.  (07 Jul 2022 14:49)    Vital Signs Last 24 Hrs  T(C): 36.8 (08 Jul 2022 06:30), Max: 36.8 (08 Jul 2022 06:30)  T(F): 98.2 (08 Jul 2022 06:30), Max: 98.2 (08 Jul 2022 06:30)  HR: 71 (08 Jul 2022 06:30) (60 - 71)  BP: 155/84 (08 Jul 2022 06:30) (152/88 - 174/96)  BP(mean): --  RR: 17 (08 Jul 2022 06:30) (16 - 18)  SpO2: 97% (08 Jul 2022 06:30) (97% - 99%)    Parameters below as of 08 Jul 2022 06:30  Patient On (Oxygen Delivery Method): room air        I&O's Summary      PHYSICAL EXAM:  GEN: laying in bed, appears well, NAD  NEURO: AOx3. FC, OE spont, speech intact, face symmetric. CNII-XII intact. PERRL, EOMI. No pronator drift. MAEx4. 5/5 strength throughout. RLE hyperreflexic patella 3+. no clonus. decreased sensation throughout b/l LEs, no dermatomal pattern.  CV: RRR +S1/S2  PULM: CTAB  GI: Abd soft, NT/ND  EXT: ext warm, dry, nontender      TUBES/LINES:  [] De Los Santos  [] Lumbar Drain  [] Wound Drains  [] Others      DIET:  [] NPO  [] Mechanical  [] Tube feeds    LABS:                        13.9   6.70  )-----------( 221      ( 08 Jul 2022 08:16 )             42.1     07-08    141  |  104  |  x   ----------------------------<  100<H>  3.6   |  26  |  0.79    Ca    8.9      08 Jul 2022 08:16    TPro  7.7  /  Alb  4.1  /  TBili  0.6  /  DBili  x   /  AST  18  /  ALT  31  /  AlkPhos  60  07-07            CAPILLARY BLOOD GLUCOSE          Drug Levels: [] N/A    CSF Analysis: [] N/A      Allergies    No Known Allergies    Intolerances      MEDICATIONS:  Antibiotics:    Neuro:  acetaminophen     Tablet .. 650 milliGRAM(s) Oral every 6 hours PRN  diclofenac 50 milliGRAM(s) Oral two times a day PRN  gabapentin 300 milliGRAM(s) Oral three times a day  melatonin 3 milliGRAM(s) Oral at bedtime PRN  ondansetron Injectable 4 milliGRAM(s) IV Push every 8 hours PRN  oxyCODONE    IR 5 milliGRAM(s) Oral every 6 hours PRN    Anticoagulation:    OTHER:  aluminum hydroxide/magnesium hydroxide/simethicone Suspension 30 milliLiter(s) Oral every 4 hours PRN    IVF:    CULTURES:    RADIOLOGY & ADDITIONAL TESTS:  MR T spine 7/7/22  1. 5 x 5 x 19 mm area of abnormal increased T2 signal within the central  portion of the spinalcord at the T2-3 level. This is concerning for a  demyelinating plaque. A syrinx versus astrocytoma is within the   differential  but felt to be much less likely.  2. The remainder of the examination is normal.  There are no significant  degenerativechanges.        ASSESSMENT:  Patient is a 60 y/o F w/ no PMH who originally presented to [facility] with 2 weeks of progressive worsening bilateral LE numbness R>L and lower back pain and right leg weakness. Patient states that numbness began when she woke up 2 weeks ago, denies trauma or previous injury now pending additional workup to rule out cord compression    PLAN:  - MRI showing concern for demyelinating process at T2-3, recommend MRI T/L spine w/ contrast for further evaluation  - Recommend neurology consult  - Continue care as per primary team     Discussed with Dr. D'Amico   HPI:  ROSSY is a 60 y/o F w/ no PMH who presented to the ED w/ 2 weeks of bilateral LE numbness R>L and lower back pain. Patient states that numbness began when she woke up 2 weeks ago, no inciting events. Numbness characterized by decreased sensation and tingling, moves throughout the entire lower extremities bilaterally, including through the feet. Patient also has lower back pain that she characterizes as a pressure. Numbness is worse with ambulation, comes and goes. Patient had a similar episode 3-4 years ago, which lasted for 2 days and went away on its own. Of note, patient had 1 episode of bowel incontinence on Saturday, where she ate a bad salad, felt that she had to go to the bathroom so tried to get there, but discovered that she had already gone. Patient did not feel herself going to the bathroom during that episode, but usually can feel herself going to the bathroom.     In the ED, vitals include /104, HR 81/min, RR 17/min, O2 sat 98% RA, Temp (F) 98.9. Patient did not receive any fluids or medication. Patient received CT lumbar spine and CT head. Neurosurgery was consulted in ED and recommended admission for MRI evaluation.  (07 Jul 2022 14:49)    Vital Signs Last 24 Hrs  T(C): 36.8 (08 Jul 2022 06:30), Max: 36.8 (08 Jul 2022 06:30)  T(F): 98.2 (08 Jul 2022 06:30), Max: 98.2 (08 Jul 2022 06:30)  HR: 71 (08 Jul 2022 06:30) (60 - 71)  BP: 155/84 (08 Jul 2022 06:30) (152/88 - 174/96)  BP(mean): --  RR: 17 (08 Jul 2022 06:30) (16 - 18)  SpO2: 97% (08 Jul 2022 06:30) (97% - 99%)    Parameters below as of 08 Jul 2022 06:30  Patient On (Oxygen Delivery Method): room air        I&O's Summary      PHYSICAL EXAM:  GEN: laying in bed, appears well, NAD  NEURO: AOx3. FC, OE spont, speech intact, face symmetric. CNII-XII intact. PERRL, EOMI. No pronator drift. MAEx4. 5/5 strength throughout. RLE hyperreflexic patella 3+. no clonus. decreased sensation throughout b/l LEs, no dermatomal pattern.  CV: RRR +S1/S2  PULM: CTAB  GI: Abd soft, NT/ND  EXT: ext warm, dry, nontender      TUBES/LINES:  [] De Los Santos  [] Lumbar Drain  [] Wound Drains  [] Others      DIET:  [] NPO  [] Mechanical  [] Tube feeds    LABS:                        13.9   6.70  )-----------( 221      ( 08 Jul 2022 08:16 )             42.1     07-08    141  |  104  |  x   ----------------------------<  100<H>  3.6   |  26  |  0.79    Ca    8.9      08 Jul 2022 08:16    TPro  7.7  /  Alb  4.1  /  TBili  0.6  /  DBili  x   /  AST  18  /  ALT  31  /  AlkPhos  60  07-07            CAPILLARY BLOOD GLUCOSE          Drug Levels: [] N/A    CSF Analysis: [] N/A      Allergies    No Known Allergies    Intolerances      MEDICATIONS:  Antibiotics:    Neuro:  acetaminophen     Tablet .. 650 milliGRAM(s) Oral every 6 hours PRN  diclofenac 50 milliGRAM(s) Oral two times a day PRN  gabapentin 300 milliGRAM(s) Oral three times a day  melatonin 3 milliGRAM(s) Oral at bedtime PRN  ondansetron Injectable 4 milliGRAM(s) IV Push every 8 hours PRN  oxyCODONE    IR 5 milliGRAM(s) Oral every 6 hours PRN    Anticoagulation:    OTHER:  aluminum hydroxide/magnesium hydroxide/simethicone Suspension 30 milliLiter(s) Oral every 4 hours PRN    IVF:    CULTURES:    RADIOLOGY & ADDITIONAL TESTS:  MR T spine 7/7/22  1. 5 x 5 x 19 mm area of abnormal increased T2 signal within the central  portion of the spinalcord at the T2-3 level. This is concerning for a  demyelinating plaque. A syrinx versus astrocytoma is within the   differential  but felt to be much less likely.  2. The remainder of the examination is normal.  There are no significant  degenerativechanges.        ASSESSMENT:  Patient is a 60 y/o F w/ no PMH who originally presented to [facility] with 2 weeks of progressive worsening bilateral LE numbness R>L and lower back pain and right leg weakness. Patient states that numbness began when she woke up 2 weeks ago, denies trauma or previous injury now pending additional workup to rule out cord compression    PLAN:  - MRI showing concern for demyelinating process at T2-3, recommend MRI c/t spine w/ contrast for further evaluation  - Recommend neurology consult  - Continue care as per primary team     Discussed with Dr. D'Amico

## 2022-07-08 NOTE — DISCHARGE NOTE PROVIDER - NSDCCPCAREPLAN_GEN_ALL_CORE_FT
PRINCIPAL DISCHARGE DIAGNOSIS  Diagnosis: Back pain  Assessment and Plan of Treatment: You came into the hospital because of back pain and leg weakness/numbness. We did a few scans of your back including a CT scan and an MRI. The CT scan showed narrowing of the nerve canal in L3-L4. This can cause weakness or numbness.  The MRI showed one area of demyelination, which means that the nerves have lost their coating. This can be seen in diseases such as multiple sclerosis. We gave you medication that helps with nerve pain called gabapentin, which we will send you home with. You can take 300mg three times a day.       PRINCIPAL DISCHARGE DIAGNOSIS  Diagnosis: Back pain  Assessment and Plan of Treatment: You came into the hospital because of back pain and leg weakness/numbness. We did a few scans of your back including a CT scan and an MRI. The CT scan showed narrowing of the nerve canal in L3-L4, which means that the nerve may not have enough room to exit the spine. This can cause weakness or numbness in your legs. The MRI showed one area of demyelination, which means that the nerves have lost their coating. This can be seen in a number of conditions, which is why we want you to follow up with a neurologist. While you were in the hospital, we gave you medication that helps with nerve pain called gabapentin, which we will send you home with. You can take 300mg three times a day if it is helping you.       PRINCIPAL DISCHARGE DIAGNOSIS  Diagnosis: Back pain  Assessment and Plan of Treatment: You came into the hospital because of back pain and leg weakness/numbness. We did a few scans of your back including a CT scan and an MRI. The CT scan showed narrowing of the nerve canal in L3-L4, which means that the nerve may not have enough room to exit the spine. This can cause weakness or numbness in your legs. The MRI showed one area of demyelination, which means that the nerves have lost their coating. This can be seen in a number of conditions, which is why we want you to follow up with a neurologist. While you were in the hospital, we gave you medication that helps with nerve pain called gabapentin, which we will send you home with. You can take 300mg three times a day if it is helping you.      SECONDARY DISCHARGE DIAGNOSES  Diagnosis: Numbness of legs  Assessment and Plan of Treatment: You presented with leg numbness. After imaging, it was determined that you had findigns of nerve impingement for which medication was prescibed for symptomatic management. However, you also had findings concerning for another underlying condition which can lead to progressive deteriation of the nervous system and requires further imaging for diagnosis. As you have chosen to leave the hospital against medical advice, you are advised to follow up with an outpatient neurologist.

## 2022-07-08 NOTE — DISCHARGE NOTE PROVIDER - HOSPITAL COURSE
#Discharge: do not delete    Patient is 60 yo F with no past medical history presented with bilateral LE numbness/weakness and low back pain, found to have severe right-sided foraminal stenosis L4-L5 and   an area at T2-T3 concerning for a demyelinating plaque.     Hospital course (by problem):   #Lower extremity weakness  The patient presented with 2 weeks of worsening bilateral LE numbness and low back pain. She underwent CT of the lumbar spine (no contrast) which showed multilevel degenerative changes most prominent at L3-4 with moderate spinal canal stenosis and R lateral recess narrowing with mild-moderate R>L neural foraminal narrowing.     Patient was discharged to: home    New medications:   Changes to old medications:  Medications that were stopped:    Items to follow up as outpatient:    Physical exam at the time of discharge:       #Discharge: do not delete    Patient is 60 yo F with no past medical history presented with bilateral LE numbness/weakness and low back pain, found to have severe right-sided foraminal stenosis L4-L5 and   an area at T2-T3 concerning for a demyelinating plaque.     Hospital course (by problem):   #Lower extremity weakness  The patient presented with 2 weeks of worsening bilateral LE numbness and low back pain. She has had one similar episode about 6 years ago which resolved spontaneously. She underwent CT of the lumbar spine (no contrast) which showed multilevel degenerative changes most prominent at L3-4 with moderate spinal canal stenosis and R lateral recess narrowing with mild-moderate R>L neural foraminal narrowing. Neurosugery recommended MRI spine without contrast which showed an area of abnormal increased T2 signal within the central portion of the spinal cord at the T2-3 level, concerning for a demyelinating plaque. Additional findings included severe R sided foraminal stenosis at L4-L5.     Patient was discharged to: home    New medications: gabapentin 300mg TID  Changes to old medications: none  Medications that were stopped: none    Items to follow up as outpatient:  --MRI results with neurosurgery    Physical exam at the time of discharge:  General: NAD, pt is comfortably resting in bed, on room air  HEENT: CN II-XII grossly intact, PERRL 3mm briskly reactive, EOMI b/l, face symmetric, tongue midline, neck FROM  Cardiovascular: RRR, normal S1 and S2   Respiratory: lungs CTAB, no wheezing, rhonchi, or crackles   GI: normoactive BS to auscultation, abd soft, NTND   Neuro: A&Ox3, No aphasia, speech clear, no dysmetria, no pronator drift. Follows commands.  DUNHAM x4 spontaneously, 5/5 strength in all extremities throughout except right HF 4+/5 in comparison to left HF. Decreased sensation throughout bilateral extremities with no specific dermatomal pattern, +extinction. Right patellar 3+, left patellar 2+, no clonus. Negative saddle anesthesia.  Extremities: distal pulses 2+ x4 #Discharge: do not delete    Patient is 60 yo F with no past medical history presented with bilateral LE numbness/weakness and low back pain, found to have severe right-sided foraminal stenosis L4-L5 and   an area at T2-T3 concerning for a demyelinating plaque.     Hospital course (by problem):   #Lower extremity weakness  The patient presented with 2 weeks of worsening bilateral LE numbness and low back pain. She has had one similar episode about 6 years ago which resolved spontaneously. She underwent CT of the lumbar spine without contrast which showed multilevel degenerative changes most prominent at L3-4 with moderate spinal canal stenosis and R lateral recess narrowing with mild-moderate R>L neural foraminal narrowing. Neurosugery recommended MRI spine without contrast which showed an area of abnormal increased T2 signal within the central portion of the spinal cord at the T2-3 level, concerning for a demyelinating plaque. Additional findings included severe R sided foraminal stenosis at L4-L5. The neurosurgery team recommended follow up with neurology as well as a MRI of the spine with contrast, but the patient requested to leave by 1pm on 7/8. She said she was not interested in physical therapy.     Patient was discharged to: home    New medications: gabapentin 300mg TID  Changes to old medications: none  Medications that were stopped: none    Items to follow up as outpatient:  Neurology: f/u regarding demyelinating plaque and severe foraminal stenosis    Physical exam at the time of discharge:  General: NAD, pt is comfortably resting in bed, on room air  HEENT: CN II-XII grossly intact, PERRL 3mm briskly reactive, EOMI b/l, face symmetric, tongue midline, neck FROM  Cardiovascular: RRR, normal S1 and S2   Respiratory: lungs CTAB, no wheezing, rhonchi, or crackles   GI: normoactive BS to auscultation, abd soft, NTND   Neuro: A&Ox3, No aphasia, speech clear, no dysmetria, no pronator drift. Follows commands.  DUNHAM x4 spontaneously, 5/5 strength in all extremities throughout except right HF 4+/5 in comparison to left HF. Decreased sensation throughout bilateral extremities with no specific dermatomal pattern, +extinction. Right patellar 3+, left patellar 2+, no clonus. Negative saddle anesthesia.  Extremities: distal pulses 2+ x4 #Discharge: do not delete    Patient is 60 yo F with no past medical history presented with bilateral LE numbness/weakness and low back pain, found to have severe right-sided foraminal stenosis L4-L5 and   an area at T2-T3 concerning for a demyelinating plaque.     Hospital course (by problem):   #Lower extremity weakness  The patient presented with 2 weeks of worsening bilateral LE numbness and low back pain. She has had one similar episode about 6 years ago which resolved spontaneously. She underwent CT of the lumbar spine without contrast which showed multilevel degenerative changes most prominent at L3-4 with moderate spinal canal stenosis and R lateral recess narrowing with mild-moderate R>L neural foraminal narrowing. Neurosugery recommended MRI spine without contrast which showed an area of abnormal increased T2 signal within the central portion of the spinal cord at the T2-3 level, concerning for a demyelinating plaque. Additional findings included severe R sided foraminal stenosis at L4-L5. The neurosurgery team recommended follow up with neurology as well as a MRI of the spine with contrast, but the patient requested to leave by 1pm on 7/8. She said she was not interested in physical therapy.   -  Pt opting to leave against medical advice, does not want to get imaging while inpatient  - Instructed to F/U with Outpatient Neurology     Patient was discharged to: home    New medications: gabapentin 300mg TID  Changes to old medications: none  Medications that were stopped: none    Items to follow up as outpatient:  Neurology: f/u regarding demyelinating plaque and severe foraminal stenosis    Physical exam at the time of discharge:  General: NAD, pt is comfortably resting in bed, on room air  HEENT: CN II-XII grossly intact, PERRL 3mm briskly reactive, EOMI b/l, face symmetric, tongue midline, neck FROM  Cardiovascular: RRR, normal S1 and S2   Respiratory: lungs CTAB, no wheezing, rhonchi, or crackles   GI: normoactive BS to auscultation, abd soft, NTND   Neuro: A&Ox3, No aphasia, speech clear, no dysmetria, no pronator drift. Follows commands.  DUNHAM x4 spontaneously, 5/5 strength in all extremities throughout except right HF 4+/5 in comparison to left HF. Decreased sensation throughout bilateral extremities with no specific dermatomal pattern, +extinction. Right patellar 3+, left patellar 2+, no clonus. Negative saddle anesthesia.  Extremities: distal pulses 2+ x4

## 2022-07-08 NOTE — PROGRESS NOTE ADULT - PROBLEM SELECTOR PLAN 2
Mild lower back pain/pressure, worse with movement    - tylenol q6 PRN for mild pain control  - diclofenac 50 mg BID PRN for moderate pain  - oxycodone IR 5 mg for severe pain

## 2022-07-14 DIAGNOSIS — M54.9 DORSALGIA, UNSPECIFIED: ICD-10-CM

## 2022-07-14 DIAGNOSIS — M48.07 SPINAL STENOSIS, LUMBOSACRAL REGION: ICD-10-CM

## 2022-07-14 DIAGNOSIS — Z53.29 PROCEDURE AND TREATMENT NOT CARRIED OUT BECAUSE OF PATIENT'S DECISION FOR OTHER REASONS: ICD-10-CM

## 2022-07-14 DIAGNOSIS — Z83.3 FAMILY HISTORY OF DIABETES MELLITUS: ICD-10-CM

## 2022-07-14 DIAGNOSIS — G37.9 DEMYELINATING DISEASE OF CENTRAL NERVOUS SYSTEM, UNSPECIFIED: ICD-10-CM

## 2022-07-14 DIAGNOSIS — Z20.822 CONTACT WITH AND (SUSPECTED) EXPOSURE TO COVID-19: ICD-10-CM

## 2022-07-14 DIAGNOSIS — Z82.49 FAMILY HISTORY OF ISCHEMIC HEART DISEASE AND OTHER DISEASES OF THE CIRCULATORY SYSTEM: ICD-10-CM

## 2022-07-14 DIAGNOSIS — M51.16 INTERVERTEBRAL DISC DISORDERS WITH RADICULOPATHY, LUMBAR REGION: ICD-10-CM

## 2022-07-18 ENCOUNTER — INPATIENT (INPATIENT)
Facility: HOSPITAL | Age: 62
LOS: 1 days | Discharge: HOME | DRG: 60 | End: 2022-07-20
Attending: PSYCHIATRY & NEUROLOGY | Admitting: PSYCHIATRY & NEUROLOGY
Payer: SELF-PAY

## 2022-07-18 VITALS
TEMPERATURE: 98 F | HEART RATE: 88 BPM | WEIGHT: 186.95 LBS | DIASTOLIC BLOOD PRESSURE: 99 MMHG | OXYGEN SATURATION: 98 % | RESPIRATION RATE: 18 BRPM | SYSTOLIC BLOOD PRESSURE: 193 MMHG

## 2022-07-18 PROBLEM — Z78.9 OTHER SPECIFIED HEALTH STATUS: Chronic | Status: ACTIVE | Noted: 2022-07-07

## 2022-07-18 LAB
ANION GAP SERPL CALC-SCNC: 12 MMOL/L — SIGNIFICANT CHANGE UP (ref 5–17)
BASOPHILS # BLD AUTO: 0.05 K/UL — SIGNIFICANT CHANGE UP (ref 0–0.2)
BASOPHILS NFR BLD AUTO: 0.6 % — SIGNIFICANT CHANGE UP (ref 0–2)
BUN SERPL-MCNC: 16 MG/DL — SIGNIFICANT CHANGE UP (ref 7–23)
CALCIUM SERPL-MCNC: 9 MG/DL — SIGNIFICANT CHANGE UP (ref 8.4–10.5)
CHLORIDE SERPL-SCNC: 103 MMOL/L — SIGNIFICANT CHANGE UP (ref 96–108)
CO2 SERPL-SCNC: 24 MMOL/L — SIGNIFICANT CHANGE UP (ref 22–31)
CREAT SERPL-MCNC: 0.94 MG/DL — SIGNIFICANT CHANGE UP (ref 0.5–1.3)
EGFR: 69 ML/MIN/1.73M2 — SIGNIFICANT CHANGE UP
EOSINOPHIL # BLD AUTO: 0.17 K/UL — SIGNIFICANT CHANGE UP (ref 0–0.5)
EOSINOPHIL NFR BLD AUTO: 2 % — SIGNIFICANT CHANGE UP (ref 0–6)
GLUCOSE BLDC GLUCOMTR-MCNC: 236 MG/DL — HIGH (ref 70–99)
GLUCOSE SERPL-MCNC: 104 MG/DL — HIGH (ref 70–99)
HCT VFR BLD CALC: 37.6 % — SIGNIFICANT CHANGE UP (ref 34.5–45)
HGB BLD-MCNC: 12.3 G/DL — SIGNIFICANT CHANGE UP (ref 11.5–15.5)
IMM GRANULOCYTES NFR BLD AUTO: 0.2 % — SIGNIFICANT CHANGE UP (ref 0–1.5)
LYMPHOCYTES # BLD AUTO: 2.09 K/UL — SIGNIFICANT CHANGE UP (ref 1–3.3)
LYMPHOCYTES # BLD AUTO: 24.7 % — SIGNIFICANT CHANGE UP (ref 13–44)
MAGNESIUM SERPL-MCNC: 1.9 MG/DL — SIGNIFICANT CHANGE UP (ref 1.6–2.6)
MCHC RBC-ENTMCNC: 29.7 PG — SIGNIFICANT CHANGE UP (ref 27–34)
MCHC RBC-ENTMCNC: 32.7 GM/DL — SIGNIFICANT CHANGE UP (ref 32–36)
MCV RBC AUTO: 90.8 FL — SIGNIFICANT CHANGE UP (ref 80–100)
MONOCYTES # BLD AUTO: 0.7 K/UL — SIGNIFICANT CHANGE UP (ref 0–0.9)
MONOCYTES NFR BLD AUTO: 8.3 % — SIGNIFICANT CHANGE UP (ref 2–14)
NEUTROPHILS # BLD AUTO: 5.42 K/UL — SIGNIFICANT CHANGE UP (ref 1.8–7.4)
NEUTROPHILS NFR BLD AUTO: 64.2 % — SIGNIFICANT CHANGE UP (ref 43–77)
NRBC # BLD: 0 /100 WBCS — SIGNIFICANT CHANGE UP (ref 0–0)
PLATELET # BLD AUTO: 211 K/UL — SIGNIFICANT CHANGE UP (ref 150–400)
POTASSIUM SERPL-MCNC: 3.6 MMOL/L — SIGNIFICANT CHANGE UP (ref 3.5–5.3)
POTASSIUM SERPL-SCNC: 3.6 MMOL/L — SIGNIFICANT CHANGE UP (ref 3.5–5.3)
RBC # BLD: 4.14 M/UL — SIGNIFICANT CHANGE UP (ref 3.8–5.2)
RBC # FLD: 14.6 % — HIGH (ref 10.3–14.5)
SARS-COV-2 RNA SPEC QL NAA+PROBE: NEGATIVE — SIGNIFICANT CHANGE UP
SODIUM SERPL-SCNC: 139 MMOL/L — SIGNIFICANT CHANGE UP (ref 135–145)
WBC # BLD: 8.45 K/UL — SIGNIFICANT CHANGE UP (ref 3.8–10.5)
WBC # FLD AUTO: 8.45 K/UL — SIGNIFICANT CHANGE UP (ref 3.8–10.5)

## 2022-07-18 PROCEDURE — 72142 MRI NECK SPINE W/DYE: CPT | Mod: 26

## 2022-07-18 PROCEDURE — 99222 1ST HOSP IP/OBS MODERATE 55: CPT

## 2022-07-18 PROCEDURE — 72147 MRI CHEST SPINE W/DYE: CPT | Mod: 26

## 2022-07-18 PROCEDURE — 99285 EMERGENCY DEPT VISIT HI MDM: CPT

## 2022-07-18 PROCEDURE — 93010 ELECTROCARDIOGRAM REPORT: CPT

## 2022-07-18 PROCEDURE — 70553 MRI BRAIN STEM W/O & W/DYE: CPT | Mod: 26

## 2022-07-18 RX ORDER — DEXTROSE 50 % IN WATER 50 %
25 SYRINGE (ML) INTRAVENOUS ONCE
Refills: 0 | Status: DISCONTINUED | OUTPATIENT
Start: 2022-07-18 | End: 2022-07-20

## 2022-07-18 RX ORDER — GLUCAGON INJECTION, SOLUTION 0.5 MG/.1ML
1 INJECTION, SOLUTION SUBCUTANEOUS ONCE
Refills: 0 | Status: DISCONTINUED | OUTPATIENT
Start: 2022-07-18 | End: 2022-07-20

## 2022-07-18 RX ORDER — DEXTROSE 50 % IN WATER 50 %
12.5 SYRINGE (ML) INTRAVENOUS ONCE
Refills: 0 | Status: DISCONTINUED | OUTPATIENT
Start: 2022-07-18 | End: 2022-07-20

## 2022-07-18 RX ORDER — LISINOPRIL 2.5 MG/1
5 TABLET ORAL EVERY 24 HOURS
Refills: 0 | Status: DISCONTINUED | OUTPATIENT
Start: 2022-07-18 | End: 2022-07-20

## 2022-07-18 RX ORDER — SODIUM CHLORIDE 9 MG/ML
1000 INJECTION, SOLUTION INTRAVENOUS
Refills: 0 | Status: DISCONTINUED | OUTPATIENT
Start: 2022-07-18 | End: 2022-07-20

## 2022-07-18 RX ORDER — POTASSIUM CHLORIDE 20 MEQ
40 PACKET (EA) ORAL ONCE
Refills: 0 | Status: COMPLETED | OUTPATIENT
Start: 2022-07-18 | End: 2022-07-18

## 2022-07-18 RX ORDER — INSULIN LISPRO 100/ML
VIAL (ML) SUBCUTANEOUS
Refills: 0 | Status: DISCONTINUED | OUTPATIENT
Start: 2022-07-18 | End: 2022-07-20

## 2022-07-18 RX ORDER — DEXTROSE 50 % IN WATER 50 %
15 SYRINGE (ML) INTRAVENOUS ONCE
Refills: 0 | Status: DISCONTINUED | OUTPATIENT
Start: 2022-07-18 | End: 2022-07-20

## 2022-07-18 RX ORDER — ENOXAPARIN SODIUM 100 MG/ML
40 INJECTION SUBCUTANEOUS EVERY 24 HOURS
Refills: 0 | Status: DISCONTINUED | OUTPATIENT
Start: 2022-07-18 | End: 2022-07-20

## 2022-07-18 RX ADMIN — Medication 58 MILLIGRAM(S): at 06:48

## 2022-07-18 RX ADMIN — LISINOPRIL 5 MILLIGRAM(S): 2.5 TABLET ORAL at 21:41

## 2022-07-18 RX ADMIN — Medication 40 MILLIEQUIVALENT(S): at 08:06

## 2022-07-18 RX ADMIN — Medication 2: at 22:12

## 2022-07-18 RX ADMIN — ENOXAPARIN SODIUM 40 MILLIGRAM(S): 100 INJECTION SUBCUTANEOUS at 22:12

## 2022-07-18 NOTE — OCCUPATIONAL THERAPY INITIAL EVALUATION ADULT - GENERAL OBSERVATIONS, REHAB EVAL
Patient received semi-supine in bed, +heplock, +room air, in NAD. Patient required maximum encouragement to participate in OT session. This therapist provided extensive education on OT's Role in the acute care setting as well as benefit of receiving an OT evaluation. Patient engaged minimally in OT eval, declining participation in certain tasks and agreeable to others.

## 2022-07-18 NOTE — ED ADULT NURSE NOTE - OBJECTIVE STATEMENT
Pt is a 62yoF presenting to the ED for worsening lower back pain with numbness and vision loss x few weeks. Pt is axox3, spont unlabored breathing on RA. Pt states that she was recently admitted for similar complaints. Patient now states that the numbness in the b/l LE has been improving, was told to present to the ED for MRI and IV steroids for vision loss. Denies CP/SOB, denies nausea/vomiting, patient ambulated well without assistance. Denies falls.

## 2022-07-18 NOTE — OCCUPATIONAL THERAPY INITIAL EVALUATION ADULT - LIGHT TOUCH SENSATION, LLE, REHAB EVAL
within normal limits Migraine , pt reports unsteady gait ; ambulates with cane ; frequent falls/headache Migraine / pt reports unsteady gait ; ambulates with cane ; frequent falls/headache

## 2022-07-18 NOTE — OCCUPATIONAL THERAPY INITIAL EVALUATION ADULT - RANGE OF MOTION EXAMINATION
as observed from movements in bed however patient did not participate in formal assessment/no Active ROM deficits were identified [de-identified] : 5/13/2020 [de-identified] : Left parietal craniotomy for resection of brain tumor [de-identified] : \par TODAY'S VISIT:\par She called the office today reporting worsening LEFT Sided pain and she was asked to come to the office for further evaluation. She does state she was very busy in the beginning of the week, running errands and cooking. Pain developed after these activities. She denies injury to the head. Currently rates pain 8/10.\par Reports worsening LEFT sided pain at top of head below incision, worsening over the last 4 days. \par \par She did take oxycodone when the pain started but feels that it made her more fatigued and it did not alleviate the pain so she stopped taking it. \par She denies s/s infection including fever, chills, incisional drainage. Denies neck pain, nausea/vomiting. Denies focal weakness, changes in vision/speech.\par \par \par She states pain is worse with palpation. Pain is not affected by position. Denies rhinorrhea/otorrhea. She took tylenol earlier today without relief.  \par \par PREVIOUS VISIT 5/29/2020:\par Reports she is doing well.\par Denies any signs of postop wound infection which could include but is not limited to redness/swelling/purulent drainage\par Denies CP/SOB/unilateral leg edema. Denies headaches, nausea, vomiting, dizziness, weakness. \par She is slowly introducing preop activities\par \par Denies seizures.\par \par She remains on keppra 1000 mg bid and dexamethasone 2 mg bid. \par She feels her RIGHT hand weakness has improved. She feels that her coordination in the RIGHT hand is still somewhat "off" and she can tell by her handwriting. She reports forgetfulness.\par \par She continues to report a right visual field cut.

## 2022-07-18 NOTE — PROVIDER CONTACT NOTE (OTHER) - ACTION/TREATMENT ORDERED:
Patient BP elevated.
Per MD Franco, no further interventions, will continue to monitor.
No further interventions, will continue to monitor.

## 2022-07-18 NOTE — OCCUPATIONAL THERAPY INITIAL EVALUATION ADULT - ADDITIONAL COMMENTS
Patient reports living alone in an apt building with [employer] (unknown how many). 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 Patient endorses blurry vision occasionally. Patient has a tub with grab bars and states she has difficulty standing in the shower. This therapist educated patient on a tub transfer bench however patient stating, "this will go away, I will be better after my medication, I do not need that."

## 2022-07-18 NOTE — PHYSICAL THERAPY INITIAL EVALUATION ADULT - PERTINENT HX OF CURRENT PROBLEM, REHAB EVAL
Pt is a 63 y/o Female who was admitted for insidious onset of worsening R eye vision. Pt was previously admitted on 07/07 for worsening BLE weakness (R > L) with LBP, and LE numbness. Pt reports having similar episode 6 yrs ago, which resolved spontaneously. Pt currently pending testing to r/o MS vs NMO.

## 2022-07-18 NOTE — H&P ADULT - NS ATTEND AMEND GEN_ALL_CORE FT
Very likely MS, has had 2 separate neurological localizations for dysfunction, optic nerve causing visual dysfunction and cord causing leg dysfunction    amendments to exam:  there is a R APD  profound decreased vib at ankles and L knee, mild decreased vib R knee, R toe upgoing, L equivocal    Need MRI brain and total spine w and w/o to confirm dx  plan is for IV solumedrol 1 g x 3 days then discharge with oral taper  monitor BP and start BP meds if needed  f/u daily labs  DVT PPx

## 2022-07-18 NOTE — ED ADULT NURSE NOTE - NSIMPLEMENTINTERV_GEN_ALL_ED
Implemented All Universal Safety Interventions:  [place] to call system. Call bell, personal items and telephone within reach. Instruct patient to call for assistance. Room bathroom lighting operational. Non-slip footwear when patient is off stretcher. Physically safe environment: no spills, clutter or unnecessary equipment. Stretcher in lowest position, wheels locked, appropriate side rails in place.

## 2022-07-18 NOTE — PATIENT PROFILE ADULT - FALL HARM RISK - HARM RISK INTERVENTIONS
Assistance with ambulation/Assistance OOB with selected safe patient handling equipment/Communicate Risk of Fall with Harm to all staff/Discuss with provider need for PT consult/Monitor gait and stability/Reinforce activity limits and safety measures with patient and family/Tailored Fall Risk Interventions/Use of alarms - bed, chair and/or voice tab/Visual Cue: Yellow wristband and red socks/Bed in lowest position, wheels locked, appropriate side rails in place/Call bell, personal items and telephone in reach/Instruct patient to call for assistance before getting out of bed or chair/Non-slip footwear when patient is out of bed/[place] to call system/Physically safe environment - no spills, clutter or unnecessary equipment/Purposeful Proactive Rounding/Room/bathroom lighting operational, light cord in reach

## 2022-07-18 NOTE — ED PROVIDER NOTE - CLINICAL SUMMARY MEDICAL DECISION MAKING FREE TEXT BOX
sent to r/o MS vs nmo. neurology consulted. basic labs obtained.  admit for further imaging/management

## 2022-07-18 NOTE — OCCUPATIONAL THERAPY INITIAL EVALUATION ADULT - DIAGNOSIS, OT EVAL
OT deficits include impaired vision and BLE tingling. Patient reports impaired balance, however unable to assess as patient declining OOB activity. Patient states she is able to work and do her ADLs.

## 2022-07-18 NOTE — H&P ADULT - NSHPSOCIALHISTORY_GEN_ALL_CORE
The patient lives alone, used to work 2 jobs, recently quit 1 job after last hospitalization, stressed about her job situation. Patient drinks alcohol 1-2 days per week, 2-3 glasses at a time. Denies any drug use or smoking. Lives in a 3rd floor walk up building.

## 2022-07-18 NOTE — OCCUPATIONAL THERAPY INITIAL EVALUATION ADULT - AMBULATORY DEVICES NEEDED
cane. patient states she has been using a cane for the past couple of days, as she feels her balance has been 'off' When asked patient if therapist can assess her balance patient declining. Patient stating she only needs the cane for now and when she gets back home it will "go back in the closet"/yes

## 2022-07-18 NOTE — PHYSICAL THERAPY INITIAL EVALUATION ADULT - GENERAL OBSERVATIONS, REHAB EVAL
Patient received in semi-supine with R IV heplock, in no acute distress, and agitated, but ultimately agreed to participate in PT eval. Patient demonstrated reduced RLE weakness and sensation, abnormal gait pattern, reduced walking endurance, and reduced balance. Patient can benefit from skilled PT intervention to address aforementioned deficits.

## 2022-07-18 NOTE — PHYSICAL THERAPY INITIAL EVALUATION ADULT - ADDITIONAL COMMENTS
Patient reports living alone in a 3rd floor walk up. Patient states she was previously independent with all ADL/sIADLs. However, patient states when onset of s/s occurred, she had to ambulate with a SC due RLE weakness that made ambulation and stair negotiation difficult and tiring. Patient denies owning any other DME in her home. Patient states she often has 2-3 jobs (, ) at a time in order to pay rent and bills. Has family in the area but does not speak to them much. Patient reports not having much family/friend support due to working long hours and days, and not having time to speak to people.

## 2022-07-18 NOTE — H&P ADULT - NSHPLABSRESULTS_GEN_ALL_CORE
Vital Signs Last 24 Hrs  T(C): 36.8 (18 Jul 2022 00:39), Max: 36.8 (18 Jul 2022 00:39)  T(F): 98.3 (18 Jul 2022 00:39), Max: 98.3 (18 Jul 2022 00:39)  HR: 88 (18 Jul 2022 00:39) (88 - 88)  BP: 193/99 (18 Jul 2022 00:39) (193/99 - 193/99)  BP(mean): --  RR: 18 (18 Jul 2022 00:39) (18 - 18)  SpO2: 98% (18 Jul 2022 00:39) (98% - 98%)    Fingerstick Blood Glucose: CAPILLARY BLOOD GLUCOSE    LABS:                        12.3   8.45  )-----------( 211      ( 18 Jul 2022 01:31 )             37.6     07-18    139  |  103  |  16  ----------------------------<  104<H>  3.6   |  24  |  0.94    Ca    9.0      18 Jul 2022 01:31  Mg     1.9     07-18

## 2022-07-18 NOTE — ED PROVIDER NOTE - MDM PATIENT STATEMENT FOR ADDL TREATMENT
Patient Information:  ROSA MARIA CASEY / 78y / Male / MRN#:[id number]    Hospital Day: 33d    Interval History:  Patient seen and examined at bedside. No acute events overnight.    Past Medical History:  Hypertension    Hyperlipidemia    Diabetes mellitus, type 2    History of atrial fibrillation    Bladder cancer    Chronic kidney disease (CKD)      Past Surgical History:  History of total hip replacement, bilateral    History of total knee replacement, bilateral    History of total cystectomy      Allergies:  No Known Allergies    Medications:  PRN:  acetaminophen   Tablet .. 650 milliGRAM(s) Oral every 6 hours PRN Temp greater or equal to 38C (100.4F), Mild Pain (1 - 3)  guaifenesin/dextromethorphan  Syrup 10 milliLiter(s) Oral every 6 hours PRN Cough    Standing:  atorvastatin 80 milliGRAM(s) Oral at bedtime  atropine Injectable 0.5 milliGRAM(s) IntraMuscular once  chlorhexidine 4% Liquid 1 Application(s) Topical <User Schedule>  cyanocobalamin 1000 MICROGram(s) Oral daily  ergocalciferol 08308 Unit(s) Oral <User Schedule>  folic acid 1 milliGRAM(s) Oral daily  furosemide    Tablet 20 milliGRAM(s) Oral daily  influenza   Vaccine 0.5 milliLiter(s) IntraMuscular once  insulin glargine Injectable (LANTUS) 21 Unit(s) SubCutaneous at bedtime  insulin lispro (ADMELOG) corrective regimen sliding scale   SubCutaneous three times a day before meals  insulin lispro Injectable (ADMELOG) 8 Unit(s) SubCutaneous three times a day before meals  magnesium gluconate 500 milliGRAM(s) Oral daily  melatonin 5 milliGRAM(s) Oral at bedtime  metoprolol succinate ER 50 milliGRAM(s) Oral daily  mupirocin 2% Nasal 1 Application(s) Nasal two times a day  pantoprazole    Tablet 40 milliGRAM(s) Oral before breakfast  polyethylene glycol 3350 17 Gram(s) Oral daily  potassium chloride  20 mEq/100 mL IVPB 20 milliEquivalent(s) IV Intermittent every 2 hours  senna 2 Tablet(s) Oral at bedtime    Home:  furosemide 20 mg oral tablet: 1 tab(s) orally once a day  glimepiride 2 mg oral tablet: 1 tab(s) orally once a day  metoprolol succinate 50 mg oral tablet, extended release: 1 tab(s) orally once a day  Ozempic (1 mg dose) 2 mg/1.5 mL subcutaneous solution:   potassium citrate 10 mEq oral tablet, extended release: 1 tab(s) orally once a day  rosuvastatin 20 mg oral tablet: 1 tab(s) orally once a day    Vitals:  T(C): 35.6, Max: 36.2 (04-13-21 @ 20:30)  T(F): 96.1, Max: 97.1 (04-13-21 @ 20:30)  HR: 61 (61 - 64)  BP: 100/67 (100/67 - 119/55)  RR: 18 (18 - 18)  SpO2: 95% (94% - 98%)    Physical Exam:  General: Awake, alert, NAD, sitting up in bed, on RA  HEENT: Head NC/AT  Heart: RRR; systolic murmur apprec  Lungs: Good air entry bilaterally  Abdomen: Soft, nontender, nondistended, BS+, urostomy bag in place, appears clean, intact  Extremities: Venous stasis skin changes of LE bilaterally  Neuro: AAOx3, NFD    Labs:  CBC (04-14 @ 06:09)                        Hgb: 7.9    WBC: 4.33  )-----------------( Plts: 89                               Hct: 25.8     Chem (04-14 @ 06:09)  Na: 133  |     Cl: 103     |  BUN: 15  -----------------------------------------< Gluc: 96    K: 3.1   |    HCO3: 22  |  Cr: 0.9    Ca 7.7 (04-14 @ 06:09)         Patient with one or more new problems requiring additional work-up/treatment.

## 2022-07-18 NOTE — OCCUPATIONAL THERAPY INITIAL EVALUATION ADULT - LEVEL OF INDEPENDENCE: GROOMING, OT EVAL
Patient states she brushed her teeth at the sink this morning independently, however therapist did not observe

## 2022-07-18 NOTE — H&P ADULT - HISTORY OF PRESENT ILLNESS
HPI: 62y Female with no PMHx, recent admission for LE numbness left AMA, sent to [facility] ED by neuro-optho for worsening R vision c/f MS. Pt admitted on 7/7 for 2 weeks of worsening bilateral LE numbness and low back pain. She has had one similar episode about 6 years ago which resolved spontaneously. At this time patient had right leg weakness 4/5. She underwent CT of the lumbar spine without contrast which showed multilevel degenerative changes most prominent at L3-4 with moderate spinal canal stenosis and R lateral recess narrowing with mild-moderate R>L neural foraminal narrowing. Neurosurgery was consulted for r/o cauda equina syndrome vs cord compression, recommended MRI spine without contrast which showed an area of abnormal increased T2 signal within the central portion of the spinal cord at the T2-3 level, concerning for a demyelinating plaque. Additional findings included severe R sided foraminal stenosis at L4-L5. The neurosurgery team recommended follow up with neurology as well as a MRI of the spine with contrast, but the patient requested to leave by 1pm on 7/8 and opted to leave [place] due to work constraints.     Since her discharge patient states her eyesight has been progressively worsening in her R eye, more difficulty with near sight. Pt has had 2 visits to the optometrist requiring 2 new glasses prescriptions. She was referred to Dr. Goldberg neuro-ophto for severe dense scotoma and profound vision loss in R eye (note in chart). Seen by patient on 7/14, was concerned for MS vs NMO and instructed patient to visit ER ASAP for IV steroids and further MR imaging. Pt states she was unable to come to the ED then due to work constraints. Pt also saw an "osteopath" ~1 week ago for her back pain and leg numbness, recommended 600mg Ibuprofen daily which patient has been compliant with. Pt states since taking Ibuprofen the numbness in her legs improved. Right leg weakness has also improved since discharge, pt states she has been flexing her toes and ankles more to build strength. Pt is able to walk, uses a cane when using transit system, states she has numbness of the soles of her feet which is hindering her gait. Pt denies episodes of bowel or bladder incontinence.     In ED: T 98.2F, HR: 70bpm, BP: 151/92mmHg, RR: 17/min, O2 sat: 99% RA. Did not receive any medications or fluids.

## 2022-07-18 NOTE — ED PROVIDER NOTE - OBJECTIVE STATEMENT
here with right eye vision loss and lower extremity numbness. Reports she was seen by neuro-ophtho and told to come for additional MRI's, IV steroids, r/o ms vs NMO. Denies fever, chills, trauma, pain. Leg numbness improving from admission here about a week and a half ago, but vision loss worsening.

## 2022-07-18 NOTE — PHYSICAL THERAPY INITIAL EVALUATION ADULT - DIAGNOSIS, PT EVAL
5E: Impaired motor function and sensory integrity associated with progressive disorders of the central nervous system.

## 2022-07-18 NOTE — PROVIDER CONTACT NOTE (OTHER) - SITUATION
Patient BP elevated 176/92 HR 71
Patient refuses to stay in hospital. Pt. stated she has to go to work she is a . Pt. requested outpatient MRI.
Patient /83 HR 85

## 2022-07-18 NOTE — ED PROVIDER NOTE - NEUROLOGICAL, MLM
Alert and oriented, no focal deficits, no motor or sensory deficits. CN 2-12 intact, speech normal, Romberg neg

## 2022-07-18 NOTE — OCCUPATIONAL THERAPY INITIAL EVALUATION ADULT - PERTINENT HX OF CURRENT PROBLEM, REHAB EVAL
62y Female with no PMHx, recent admission for LE numbness left AMA, sent to [facility] ED by neuro-optho for worsening R vision and improving LE numbness, admitted for likely MS flare with treatment with IV steroids. Pending further MR imaging.

## 2022-07-18 NOTE — H&P ADULT - NSHPPHYSICALEXAM_GEN_ALL_CORE
Mental Status - Patient is alert, awake, oriented X3.   Follows commands well and able to answer questions appropriately. Mood and affect  normal  Speech -   Fluent                         Cranial Nerves - Pupils 3 mm equal and reactive to light,   extraocular eye movements intact.   Motor Exam -   Right upper 5/5  Left upper 5/5  Right lower 4/5 proximal and distal  Left lower 5-/5 distally, proximally 5/5  RLE Hyperreflexia in patella+3  LLE reflexes +2  RUE reflexs +2  LUE reflexes +2  Upgoing toes on babinski b/l    With stimuli positive movement of all 4 extremities  Muscle tone - is normal all over. No asymmetry is seen.    Sensory  diminished vibration sense from the knees down bilaterally, decreased proprioception in toes bilaterally. decreased sensation to light touch on b/l LE but symmetric, decreased sensation in T9 and T10. diminished sensation to soles of feet in non-dermatomal distribution.   Gait -  unsteady narrow stance on heels     GENERAL Exam: No Acute Distress   HEENT:  normocephalic, atraumatic  LUNGS:	Clear bilaterally  No Wheeze  Decreased bilaterally  HEART:	 Normal S1S2   No murmur RRR        GI/ ABDOMEN:  Soft  Non tender  EXTREMITIES:   No Edema  No Clubbing  No Cyanosis No Edema  MUSCULOSKELETAL: Normal Range of Motion  SKIN:      Normal   No Ecchymosis Mental Status - Patient is alert, awake, oriented X3.   Follows commands well and able to answer questions appropriately. Mood and affect  normal  Speech -   Fluent                         Cranial Nerves - Pupils 3 mm equal and reactive to light,   extraocular eye movements intact. Visual fields intact to confrontation.   Motor Exam -   Right upper 5/5  Left upper 5/5  Right lower 4/5 proximal and distal  Left lower 5-/5 distally, proximally 5/5  RLE Hyperreflexia in patella+3  LLE reflexes +2  RUE reflexs +2  LUE reflexes +2  Upgoing toes on babinski b/l    With stimuli positive movement of all 4 extremities  Muscle tone - is normal all over. No asymmetry is seen.    Sensory  diminished vibration sense from the knees down bilaterally, decreased proprioception in toes bilaterally. decreased sensation to light touch on b/l LE but symmetric, decreased sensation in T9 and T10. diminished sensation to soles of feet in non-dermatomal distribution.   Gait -  unsteady narrow stance on heels     GENERAL Exam: No Acute Distress   HEENT:  normocephalic, atraumatic  LUNGS:	Clear bilaterally  No Wheeze  Decreased bilaterally  HEART:	 Normal S1S2   No murmur RRR        GI/ ABDOMEN:  Soft  Non tender  EXTREMITIES:   No Edema  No Clubbing  No Cyanosis No Edema  MUSCULOSKELETAL: Normal Range of Motion  SKIN:      Normal   No Ecchymosis

## 2022-07-18 NOTE — ED ADULT TRIAGE NOTE - CHIEF COMPLAINT QUOTE
pt. c/o b/l lower leg numbness for 1 month, decreased vision in Rt eye getting worse over 2 weeks, sent by MD for MRI

## 2022-07-18 NOTE — H&P ADULT - ASSESSMENT
62y Female with no PMHx, recent admission for LE numbness left AMA, sent to [facility] ED by neuro-optho for worsening R vision and improving LE numbness, admitted for likely MS flare with treatment with IV steroids. Pending further MR imaging.    Neuro  #likely MS flare - patient with b/l LE numbness and R leg weakness which per patient have improved since last admission. Worsening R vision compared to prior hospitalization. C/f demyelinating disease.   - initiate 1g Solumedrol IVPB on 7/18  - obtain MRI brain +/- contrast, MRI C/T spine w/ contrast  - MRI C/T w/o contrast studies completed in prior admission - area of abnormal increased T2 signal within the central portion of the spinal cord at the T2-3 level, concerning for a demyelinating plaque.  - q8h general neuro checks/vitals  - PT/OT evaluation    # Prophylactic measure.   F: PO intake  E: PO intake  N: regular diet  D: SCDs   Dispo: admit to [facility]. PT/OT consult in place.     Full code.    Further recs pending in AM    Discussed case with Neurology Attending Dr. Hillman

## 2022-07-18 NOTE — PATIENT PROFILE ADULT - FUNCTIONAL ASSESSMENT - BASIC MOBILITY 6.
3-calculated by average/Not able to assess (calculate score using [facility] averaging method)

## 2022-07-18 NOTE — H&P ADULT - NSHPREVIEWOFSYSTEMS_GEN_ALL_CORE
CONSTITUTIONAL: No weakness, fevers, chills, changes in weight  EYES/ENT: decreased vision in R eye, contact lenses in place, muffled R ear  NECK: No pain or stiffness  RESPIRATORY: No cough, wheezing, hemoptysis; No shortness of breath  CARDIOVASCULAR: no chest pain or palpitations  GASTROINTESTINAL: No abdominal or epigastric pain. No nausea, vomiting, or hematemesis; No diarrhea or constipation. No melena or hematochezia.  GENITOURINARY: No dysuria, frequency or hematuria  NEUROLOGICAL: Positive for numbness and weakness  SKIN: No itching, rashes

## 2022-07-18 NOTE — PHYSICAL THERAPY INITIAL EVALUATION ADULT - ACTIVE RANGE OF MOTION EXAMINATION, REHAB EVAL
stef. upper extremity Active ROM was WNL (within normal limits)/bilateral lower extremity Active ROM was WNL (within normal limits)

## 2022-07-19 ENCOUNTER — TRANSCRIPTION ENCOUNTER (OUTPATIENT)
Age: 62
End: 2022-07-19

## 2022-07-19 DIAGNOSIS — Z29.9 ENCOUNTER FOR PROPHYLACTIC MEASURES, UNSPECIFIED: ICD-10-CM

## 2022-07-19 DIAGNOSIS — G35 MULTIPLE SCLEROSIS: ICD-10-CM

## 2022-07-19 DIAGNOSIS — I10 ESSENTIAL (PRIMARY) HYPERTENSION: ICD-10-CM

## 2022-07-19 LAB
ANION GAP SERPL CALC-SCNC: 9 MMOL/L — SIGNIFICANT CHANGE UP (ref 5–17)
BUN SERPL-MCNC: 15 MG/DL — SIGNIFICANT CHANGE UP (ref 7–23)
CALCIUM SERPL-MCNC: 8.9 MG/DL — SIGNIFICANT CHANGE UP (ref 8.4–10.5)
CHLORIDE SERPL-SCNC: 105 MMOL/L — SIGNIFICANT CHANGE UP (ref 96–108)
CO2 SERPL-SCNC: 24 MMOL/L — SIGNIFICANT CHANGE UP (ref 22–31)
CREAT SERPL-MCNC: 0.82 MG/DL — SIGNIFICANT CHANGE UP (ref 0.5–1.3)
EGFR: 81 ML/MIN/1.73M2 — SIGNIFICANT CHANGE UP
GLUCOSE BLDC GLUCOMTR-MCNC: 105 MG/DL — HIGH (ref 70–99)
GLUCOSE BLDC GLUCOMTR-MCNC: 135 MG/DL — HIGH (ref 70–99)
GLUCOSE BLDC GLUCOMTR-MCNC: 149 MG/DL — HIGH (ref 70–99)
GLUCOSE SERPL-MCNC: 118 MG/DL — HIGH (ref 70–99)
HCT VFR BLD CALC: 38.3 % — SIGNIFICANT CHANGE UP (ref 34.5–45)
HGB BLD-MCNC: 12.6 G/DL — SIGNIFICANT CHANGE UP (ref 11.5–15.5)
MAGNESIUM SERPL-MCNC: 2.1 MG/DL — SIGNIFICANT CHANGE UP (ref 1.6–2.6)
MCHC RBC-ENTMCNC: 30.2 PG — SIGNIFICANT CHANGE UP (ref 27–34)
MCHC RBC-ENTMCNC: 32.9 GM/DL — SIGNIFICANT CHANGE UP (ref 32–36)
MCV RBC AUTO: 91.8 FL — SIGNIFICANT CHANGE UP (ref 80–100)
NRBC # BLD: 0 /100 WBCS — SIGNIFICANT CHANGE UP (ref 0–0)
PLATELET # BLD AUTO: 229 K/UL — SIGNIFICANT CHANGE UP (ref 150–400)
POTASSIUM SERPL-MCNC: 3.9 MMOL/L — SIGNIFICANT CHANGE UP (ref 3.5–5.3)
POTASSIUM SERPL-SCNC: 3.9 MMOL/L — SIGNIFICANT CHANGE UP (ref 3.5–5.3)
RBC # BLD: 4.17 M/UL — SIGNIFICANT CHANGE UP (ref 3.8–5.2)
RBC # FLD: 14.6 % — HIGH (ref 10.3–14.5)
SODIUM SERPL-SCNC: 138 MMOL/L — SIGNIFICANT CHANGE UP (ref 135–145)
WBC # BLD: 15.15 K/UL — HIGH (ref 3.8–10.5)
WBC # FLD AUTO: 15.15 K/UL — HIGH (ref 3.8–10.5)

## 2022-07-19 PROCEDURE — 99232 SBSQ HOSP IP/OBS MODERATE 35: CPT

## 2022-07-19 RX ORDER — POTASSIUM CHLORIDE 20 MEQ
20 PACKET (EA) ORAL ONCE
Refills: 0 | Status: COMPLETED | OUTPATIENT
Start: 2022-07-19 | End: 2022-07-19

## 2022-07-19 RX ADMIN — Medication 58 MILLIGRAM(S): at 06:58

## 2022-07-19 RX ADMIN — LISINOPRIL 5 MILLIGRAM(S): 2.5 TABLET ORAL at 22:42

## 2022-07-19 RX ADMIN — ENOXAPARIN SODIUM 40 MILLIGRAM(S): 100 INJECTION SUBCUTANEOUS at 22:42

## 2022-07-19 RX ADMIN — Medication 20 MILLIEQUIVALENT(S): at 09:34

## 2022-07-19 NOTE — DISCHARGE NOTE PROVIDER - NSDCFUSCHEDAPPT_GEN_ALL_CORE_FT
Park Hillman  [facility]  NEUROLOGY [address]  Scheduled Appointment: 09/07/2022

## 2022-07-19 NOTE — CHART NOTE - NSCHARTNOTEFT_GEN_A_CORE
Visual acuity exam:   Without optic correction:   OD - 0.25  OS - 0.2     With optic correction:   OD - 0.2  OS - 0.4    Ishihara color testing   OD - 0%  OS - >80%       Lissa Franco MD  Neurology resident, PGY2 Visual acuity exam:   Without optic correction:   OD - 0.25 (20/80)  OS - 0.2 (20/100)     With optic correction:   OD - 0.2 (20/100)  OS - 0.4 (20/50)    Ishihara color testing   OD - 0%  OS - >80%     Contrast sensitivity testing  OD - 0.40  OS - 1.35     Lissa Franco MD  Neurology resident, PGY2

## 2022-07-19 NOTE — PROGRESS NOTE ADULT - PROBLEM SELECTOR PLAN 3
F: none  E: replenishs as appropriate  N: regular    VTE Prophylaxis: Lovenox 40mg q24h  GI: not needed  C: Full Code  D: home w/ outpatient PT

## 2022-07-19 NOTE — PROGRESS NOTE ADULT - PROBLEM SELECTOR PLAN 2
Pt does not see physicians and has not had blood pressure checked in several years.  BP consistently elevated inpatient, likely chronic component vs component of white coat syndrome.  Amenable to starting anti hypertensive inpatient  - c/w lisinopril 5mg qd

## 2022-07-19 NOTE — DISCHARGE NOTE PROVIDER - CARE PROVIDER_API CALL
Park Hillman)  Neurology  [address] [place] [address]  Phone: [phone]  Fax: [phone]  Scheduled Appointment: 09/07/2022 02:00 PM   Park Hillman)  Neurology  [address], [address] [place] [address]  Phone: [phone]  Fax: [phone]  Scheduled Appointment: 09/07/2022 02:00 PM    Promise Patel (DO)  Internal Medicine  [address] [address]  Phone: [phone]  Fax: [phone]  Follow Up Time: 1 week

## 2022-07-19 NOTE — DISCHARGE NOTE PROVIDER - NSFOLLOWUPCLINICS_GEN_ALL_ED_FT
[facility]  Family Medicine  [address]  [place], [place] [address]  Phone: [phone]  Fax:   Follow Up Time: 1 week

## 2022-07-19 NOTE — DISCHARGE NOTE PROVIDER - NSDCMRMEDTOKEN_GEN_ALL_CORE_FT
gabapentin 300 mg oral capsule: 1 cap(s) orally 3 times a day   lisinopril 5 mg oral tablet: 1 tab(s) orally once a day   predniSONE 20 mg oral tablet: Take 3 tablets, once per day, on July 21 and 22.  Take 2 tablets, once per day, from July 23 through 25.  Take 1 tablet, once per day, on July 26 and 27.   lisinopril 5 mg oral tablet: 1 tab(s) orally once a day   predniSONE 10 mg oral tablet: Take 6 tablets on July 23rd.  Take 5 tablets on July 24th.  Take 4 tablets on July 25th.  Take 3 tablets on July 26th.  Take 2 tablets on July 27th.  predniSONE 50 mg oral tablet: 20 tab(s) orally once a day.  Take on July 21st and 22nd.   lisinopril 5 mg oral tablet: 1 tab(s) orally once a day   pantoprazole 40 mg oral granule, delayed release: 1 each orally once a day   predniSONE 10 mg oral tablet: Take 6 tablets on July 23rd.  Take 5 tablets on July 24th.  Take 4 tablets on July 25th.  Take 3 tablets on July 26th.  Take 2 tablets on July 27th.  predniSONE 50 mg oral tablet: 20 tab(s) orally once a day.  Take on July 21st and 22nd.

## 2022-07-19 NOTE — DISCHARGE NOTE PROVIDER - PROVIDER TOKENS
PROVIDER:[TOKEN:[[id number]],SCHEDULEDAPPT:[09/07/2022],SCHEDULEDAPPTTIME:[02:00 PM]] PROVIDER:[TOKEN:[[id number]],SCHEDULEDAPPT:[09/07/2022],SCHEDULEDAPPTTIME:[02:00 PM]],PROVIDER:[TOKEN:[[id number]],FOLLOWUP:[1 week]]

## 2022-07-19 NOTE — DISCHARGE NOTE PROVIDER - NSDCCPTREATMENT_GEN_ALL_CORE_FT
PRINCIPAL PROCEDURE  Procedure: MRI head  Findings and Treatment: FINDINGS:  Their are multiple patchy T2/FLAIR hyperintense lesions involving the   supratentorial and infratentorial white matter. These lesions are most   prominent along the posterior body of the corpus callosum (right greater   than left) as well as the adjacent periventricular white matter. In   addition there is a lesion in the subcortical U fibers left parietal   region. There are FLAIR hyperintense lesions involving the left ventral   midbrain, right lateral ramses and medulla bilaterally. Several of these   limitations demonstrate partial enhancement most prominent along the   posterior body of corpus callosum and adjacent   periventricular/subcortical white matter, left ventral midbrain, right   inferior frontal lobe subcortical white matter. Several of these lesions   demonstrate mild restricted diffusion.  There is there is no acute hydrocephalus. There is no mass effect or   midline shift. There is no acute intracranial hemorrhage. There is no   susceptibility related signal loss to suggest hemosiderin deposition or   calcification.  The vascular flow voids are present.  The sella and suprasellar regions are unremarkable.  The postcontrast images demonstrate no abnormal intracranial enhancement.  The visualized paranasal sinuses are free of mucosal disease. The mastoid   air cells are well-aerated.  IMPRESSION:  Multiple supratentorial and infratentorial white matter lesions as   detailed above. Several of these lesions demonstrate enhancement and are   mild restricted diffusion. These findings are suspicious for   demyelinating disease such as multiple sclerosis with the enhancement   indicating areas of possible active demyelination in this context.        SECONDARY PROCEDURE  Procedure: MRI C spine  Findings and Treatment: FINDINGS: MRI cervical spine: There is focal T2/STIR hyperintense signal   within the right lateral aspect of the cord at the C5-C6 level extending   for length of 0.9 cm (series 16 image 9 and series 8 image 29). There is   no abnormal enhancement.  There is straightening the normal cervical lordosis. The vertebral body   heights are maintained. There is diffuse disc desiccation with multilevel   mild to moderate disc space narrowing most prominent at C5-6 and C6-7.   There are prominent anterior osteophytes. There is trace anterolisthesis   of C2 on C3 and C3 and C4 with retrolisthesis of C5 on C6. The bone   marrow signal is appropriate for patient's age. The prevertebral and   paravertebral soft tissues are unremarkable.  At the C3/4 level, there is a right lateral recess/foraminal disc   herniation resulting in moderate right neural foraminal narrowing and   mild right lateral recess narrowing..  At the C4/5 level, there is a right lateral recess disc herniation   without significant spinal canal stenosis. There is mild right neural   foraminal narrowing.  At the C5/6 level, there is posterior osseous ridging with mild spinal   canal stenosis. There is uncovertebral and facet hypertrophy with   moderate bilateral neural foraminal narrowing.  At the C6/7 level, there is a disc bulge without significant spinal canal   stenosis. There is uncovertebral spurring with mild to moderate bilateral   neural foraminal narrowing..  At the C7/T1 level, there is no disc herniation. There is no central   spinal canal stenosis or neural foramen narrowing.      Procedure: MRI thoracic spine  Findings and Treatment: MRI thoracic spine: Again demonstrated is central T2 hyperintense cord   signal abnormality at the T2/T3 levels (series 22 image 8 and series 25   image 7) which is stable from prior examination accounting for   differences in technique. There is no associated abnormal enhancement.   Again demonstrated is mild cord expansion at this level. There are no   additional sites of cord signal abnormality.  IMPRESSION:  Nonenhancing cord signal abnormality at C5-6 on the right and at T2-T3   central cord. The cord signal abnormality T2-3 is stable from prior exam.   Given the presence of multiple intracranial lesions some of which   demonstrate enhancement, these findings are suspicious for demyelinating   disease such as multiple sclerosis.

## 2022-07-19 NOTE — DISCHARGE NOTE PROVIDER - ATTENDING DISCHARGE PHYSICAL EXAMINATION:
Focused examination  CNs EOMI, PERRL, + R APD, VFF, face symmetric, tongue and uvula midline  Motor: mild weakness in right more than left lower extremity distally more than proximally  sensory: proprioceptive deficit b/l great toes, decreased vib to ankles  reflexes: 3+ uppers and knees with crossed adductors  gait, slight right leg circumduction, mildly wide base, stable without walker or cane

## 2022-07-19 NOTE — PROGRESS NOTE ADULT - ATTENDING COMMENTS
some subjective improvement after first day of steroids. multiple neurological events in space and time with MRI findings make MS likely.  HTN in hospital previously undiagnosed    3 days IV solumedtrol 1000mg, followed by PO taper  lisinopril for HTN is new  PT/OT

## 2022-07-19 NOTE — PROGRESS NOTE ADULT - SUBJECTIVE AND OBJECTIVE BOX
INTERVAL EVENTS: TANIYA    SUBJECTIVE / INTERVAL HPI: Patient seen and examined at bedside.     ROS: negative unless otherwise stated above.    VITAL SIGNS:  Vital Signs Last 24 Hrs  T(C): 37.4 (19 Jul 2022 05:53), Max: 37.4 (19 Jul 2022 05:53)  T(F): 99.3 (19 Jul 2022 05:53), Max: 99.3 (19 Jul 2022 05:53)  HR: 75 (19 Jul 2022 05:53) (75 - 103)  BP: 146/74 (19 Jul 2022 05:53) (146/74 - 175/83)  BP(mean): 103 (18 Jul 2022 20:32) (103 - 116)  RR: 18 (19 Jul 2022 05:53) (18 - 20)  SpO2: 100% (19 Jul 2022 05:53) (96% - 100%)    Parameters below as of 18 Jul 2022 21:20  Patient On (Oxygen Delivery Method): room air            PHYSICAL EXAM:    General: NAD  HEENT: MMM  Neck: supple  Cardiovascular: +S1/S2; RRR  Respiratory: CTA B/L; no W/R/R  Gastrointestinal: soft, NT/ND  Extremities: WWP; no edema, clubbing or cyanosis  Vascular: 2+ radial, DP/PT pulses B/L  Neurological: AAOx3; no focal deficits    MEDICATIONS:  MEDICATIONS  (STANDING):  dextrose 5%. 1000 milliLiter(s) (50 mL/Hr) IV Continuous <Continuous>  dextrose 5%. 1000 milliLiter(s) (100 mL/Hr) IV Continuous <Continuous>  dextrose 50% Injectable 25 Gram(s) IV Push once  dextrose 50% Injectable 12.5 Gram(s) IV Push once  dextrose 50% Injectable 25 Gram(s) IV Push once  enoxaparin Injectable 40 milliGRAM(s) SubCutaneous every 24 hours  glucagon  Injectable 1 milliGRAM(s) IntraMuscular once  insulin lispro (ADMELOG) corrective regimen sliding scale   SubCutaneous Before meals and at bedtime  lisinopril 5 milliGRAM(s) Oral every 24 hours  methylPREDNISolone sodium succinate IVPB 1000 milliGRAM(s) IV Intermittent every 24 hours    MEDICATIONS  (PRN):  dextrose Oral Gel 15 Gram(s) Oral once PRN Blood Glucose LESS THAN 70 milliGRAM(s)/deciliter      ALLERGIES:  Allergies    No Known Allergies    Intolerances        LABS:                        12.6   15.15 )-----------( 229      ( 19 Jul 2022 07:05 )             38.3     07-18    139  |  103  |  16  ----------------------------<  104<H>  3.6   |  24  |  0.94    Ca    9.0      18 Jul 2022 01:31  Mg     1.9     07-18          CAPILLARY BLOOD GLUCOSE      POCT Blood Glucose.: 236 mg/dL (18 Jul 2022 21:49)      RADIOLOGY & ADDITIONAL TESTS: Reviewed. INTERVAL EVENTS: TANIYA    SUBJECTIVE / INTERVAL HPI: Patient seen and examined at bedside. Reports feeling well. She feels like her legs have improved. Not sure if her vision has improved. Denies headache, CP, palpitations, abd pain, diarrhea, dysuria.    ROS: negative unless otherwise stated above.    VITAL SIGNS:  Vital Signs Last 24 Hrs  T(C): 37.4 (19 Jul 2022 05:53), Max: 37.4 (19 Jul 2022 05:53)  T(F): 99.3 (19 Jul 2022 05:53), Max: 99.3 (19 Jul 2022 05:53)  HR: 75 (19 Jul 2022 05:53) (75 - 103)  BP: 146/74 (19 Jul 2022 05:53) (146/74 - 175/83)  BP(mean): 103 (18 Jul 2022 20:32) (103 - 116)  RR: 18 (19 Jul 2022 05:53) (18 - 20)  SpO2: 100% (19 Jul 2022 05:53) (96% - 100%)    Parameters below as of 18 Jul 2022 21:20  Patient On (Oxygen Delivery Method): room air    PHYSICAL EXAM:    General: NAD, sitting up in bed  HEENT: MMM, normal oral cavity  Neck: supple, no JVD  Cardiovascular: +S1/S2; RRR  Respiratory: CTA B/L; no W/R/R  Gastrointestinal: soft, NT/ND  Extremities: WWP; no edema, clubbing or cyanosis  Vascular: 2+ radial, DP pulses B/L  Neurological: AAOx3; RLE 4/5, LLE 5-/5 distally; RLE Hyperreflexia in patella+3; Upgoing toes on babinski b/l; decreased sensation to light touch on b/l LE     MEDICATIONS:  MEDICATIONS  (STANDING):  dextrose 5%. 1000 milliLiter(s) (50 mL/Hr) IV Continuous <Continuous>  dextrose 5%. 1000 milliLiter(s) (100 mL/Hr) IV Continuous <Continuous>  dextrose 50% Injectable 25 Gram(s) IV Push once  dextrose 50% Injectable 12.5 Gram(s) IV Push once  dextrose 50% Injectable 25 Gram(s) IV Push once  enoxaparin Injectable 40 milliGRAM(s) SubCutaneous every 24 hours  glucagon  Injectable 1 milliGRAM(s) IntraMuscular once  insulin lispro (ADMELOG) corrective regimen sliding scale   SubCutaneous Before meals and at bedtime  lisinopril 5 milliGRAM(s) Oral every 24 hours  methylPREDNISolone sodium succinate IVPB 1000 milliGRAM(s) IV Intermittent every 24 hours    MEDICATIONS  (PRN):  dextrose Oral Gel 15 Gram(s) Oral once PRN Blood Glucose LESS THAN 70 milliGRAM(s)/deciliter      ALLERGIES:  Allergies    No Known Allergies    Intolerances        LABS:                        12.6   15.15 )-----------( 229      ( 19 Jul 2022 07:05 )             38.3     07-18    139  |  103  |  16  ----------------------------<  104<H>  3.6   |  24  |  0.94    Ca    9.0      18 Jul 2022 01:31  Mg     1.9     07-18          CAPILLARY BLOOD GLUCOSE      POCT Blood Glucose.: 236 mg/dL (18 Jul 2022 21:49)      RADIOLOGY & ADDITIONAL TESTS: Reviewed.

## 2022-07-19 NOTE — PROGRESS NOTE ADULT - PROBLEM SELECTOR PLAN 1
Patient with b/l LE numbness and R leg weakness which per patient have improved since last admission. P/w worsening R vision compared to prior hospitalization. C/f demyelinating disease. MR brain w/ areas of active inflammation. MR shows multiple areas c/w demyelinating disease.   Symptoms likely 2/2 multiple sclerosis flare  - c/w 1g Solumedrol IVPB x3 days (7/18-7/21)  - q8h general neuro checks/vitals  - PT rec outpatient PT  - plan for outpatient prednisone taper starting thursday  - to follow up with Dr. Hillman in 1 month

## 2022-07-19 NOTE — DISCHARGE NOTE PROVIDER - CARE PROVIDERS DIRECT ADDRESSES
[email or web address]

## 2022-07-19 NOTE — DISCHARGE NOTE PROVIDER - HOSPITAL COURSE
#Discharge: do not delete    62y Female with no PMHx, recent admission for LE numbness left AMA, sent to [facility] ED by neuro-optho for worsening R vision and improving LE numbness, admitted for likely MS flare with treatment with IV steroids.      Hospital course (by problem):   #MS flare(multiple sclerosis).   Patient with b/l LE numbness and R leg weakness which per patient have improved since last admission. P/w worsening R vision compared to prior hospitalization. C/f demyelinating disease. MR brain w/ areas of active inflammation. MR shows multiple areas c/w demyelinating disease.   Symptoms likely 2/2 multiple sclerosis flare  - 1g Solumedrol IVPB x3 days (7/18-7/21)  - PT rec outpatient PT  - plan for outpatient prednisone taper starting thursday  - to follow up with Dr. Hillman in 1 month.    #HTN (hypertension).   Pt does not see physicians and has not had blood pressure checked in several years.  BP consistently elevated inpatient, likely chronic component vs component of white coat syndrome.  Amenable to starting anti hypertensive inpatient  - c/w lisinopril 5mg qd.    Patient was discharged to: home with outpatient PT    New medications: prednisone, lisinopril  Changes to old medications: none  Medications that were stopped: none    Items to follow up as outpatient: Neurology follow up    Physical exam at the time of discharge:       #Discharge: do not delete    62y Female with no PMHx, recent admission for LE numbness left AMA, sent to [facility] ED by neuro-optho for worsening R vision and improving LE numbness, admitted for likely MS flare with treatment with IV steroids.      Hospital course (by problem):   #MS flare(multiple sclerosis).   Patient with b/l LE numbness and R leg weakness which per patient have improved since last admission. P/w worsening R vision compared to prior hospitalization. C/f demyelinating disease. MR brain w/ areas of active inflammation. MR shows multiple areas c/w demyelinating disease.   Symptoms likely 2/2 multiple sclerosis flare  - 1g Solumedrol IVPB x3 days (7/18-7/21)  - PT rec outpatient PT  - outpatient prednisone taper starting thursday  - to follow up with Dr. Hillman in 1 month.    #HTN (hypertension).   Pt does not see physicians and has not had blood pressure checked in several years.  BP consistently elevated inpatient, likely chronic component vs component of white coat syndrome.  Amenable to starting anti hypertensive inpatient  - c/w lisinopril 5mg qd.    Patient was discharged to: home with outpatient PT  Meds sent to patient's preferred pharmacy.  New medications: prednisone, lisinopril  Changes to old medications: none  Medications that were stopped: none    Items to follow up as outpatient: Neurology follow up    Physical exam at the time of discharge:  HEENT: MMM, normal oral cavity  Neck: supple, no JVD  Cardiovascular: +S1/S2; RRR  Respiratory: CTA B/L; no W/R/R  Gastrointestinal: soft, NT/ND  Extremities: WWP; no edema, clubbing or cyanosis  Vascular: 2+ radial, DP pulses B/L  Neurological: AAOx3; RLE 4/5, LLE 5-/5 distally; RLE Hyperreflexia in patella+3; Upgoing toes on babinski b/l; decreased sensation to light touch on b/l LE      #Discharge: do not delete    62y Female with no PMHx, recent admission for LE numbness left AMA, sent to [facility] ED by neuro-optho for worsening R vision and improving LE numbness, admitted for likely MS flare with treatment with IV steroids.      Hospital course (by problem):   #MS flare(multiple sclerosis).   Patient with b/l LE numbness and R leg weakness which per patient have improved since last admission. P/w worsening R vision compared to prior hospitalization. C/f demyelinating disease. MR brain w/ areas of active inflammation. MR shows multiple areas c/w demyelinating disease.   Symptoms likely 2/2 multiple sclerosis flare  - 1g Solumedrol IVPB x3 days (7/18-7/21)  - PT rec outpatient PT  - outpatient prednisone taper starting thursday: 1g PO July 21 and 22. 60mg on July 23. 50mg on July 24. 40mg on July 25. 30mg on July 26. 20mg on July 27.  - to follow up with Dr. Hillman in 1 month.    #HTN (hypertension).   Pt does not see physicians and has not had blood pressure checked in several years.  BP consistently elevated inpatient, likely chronic component vs component of white coat syndrome.  Amenable to starting anti hypertensive inpatient  - c/w lisinopril 5mg qd.    Patient was discharged to: home with outpatient PT  Meds sent to patient's preferred pharmacy.  New medications: prednisone, lisinopril  Changes to old medications: none  Medications that were stopped: none    Items to follow up as outpatient: Neurology follow up    Physical exam at the time of discharge:  HEENT: MMM, normal oral cavity  Neck: supple, no JVD  Cardiovascular: +S1/S2; RRR  Respiratory: CTA B/L; no W/R/R  Gastrointestinal: soft, NT/ND  Extremities: WWP; no edema, clubbing or cyanosis  Vascular: 2+ radial, DP pulses B/L  Neurological: AAOx3; RLE 4/5, LLE 5-/5 distally; RLE Hyperreflexia in patella+3; Upgoing toes on babinski b/l; decreased sensation to light touch on b/l LE      #Discharge: do not delete    62y Female with no PMHx, recent admission for LE numbness left AMA, sent to [facility] ED by neuro-optho for worsening R vision and improving LE numbness, admitted for likely MS flare with treatment with IV steroids.      Hospital course (by problem):   #MS flare(multiple sclerosis).   Patient with b/l LE numbness and R leg weakness which per patient have improved since last admission. P/w worsening R vision compared to prior hospitalization. C/f demyelinating disease. MR brain w/ areas of active inflammation. MR shows multiple areas c/w demyelinating disease.   After 3 days of IV steroids she had significant improvement in gait and some subjective improvement in vision  Symptoms likely 2/2 multiple sclerosis flare  - 1g Solumedrol IVPB x3 days (7/18-7/21)  - PT rec outpatient PT  - outpatient prednisone taper starting thursday: 1g PO July 21 and 22. 60mg on July 23. 50mg on July 24. 40mg on July 25. 30mg on July 26. 20mg on July 27.  - to follow up with Dr. Hillman in 1 month.  - also will follow up with her neuro opthalmologist Dr Goldberg within a month    #HTN (hypertension).   Pt does not see physicians and has not had blood pressure checked in several years.  BP consistently elevated inpatient, likely chronic component vs component of white coat syndrome.  Amenable to starting anti hypertensive inpatient  - c/w lisinopril 5mg qd.  -educated on possible side effects including dry cough    Patient was discharged to: home with outpatient PT  Meds sent to patient's preferred pharmacy.  New medications: prednisone, lisinopril  Changes to old medications: none  Medications that were stopped: none    Items to follow up as outpatient: Neurology follow up    Physical exam at the time of discharge:  HEENT: MMM, normal oral cavity  Neck: supple, no JVD  Cardiovascular: +S1/S2; RRR  Respiratory: CTA B/L; no W/R/R  Gastrointestinal: soft, NT/ND  Extremities: WWP; no edema, clubbing or cyanosis  Vascular: 2+ radial, DP pulses B/L  Neurological: AAOx3; RLE 4/5, LLE 5-/5 distally; RLE Hyperreflexia in patella+3; Upgoing toes on babinski b/l; decreased sensation to light touch on b/l LE

## 2022-07-19 NOTE — PROGRESS NOTE ADULT - ASSESSMENT
62y Female with no PMHx, recent admission for LE numbness left AMA, sent to [facility] ED by neuro-optho for worsening R vision and improving LE numbness, admitted for likely MS flare with treatment with IV steroids. MRI c/w multiple sclerosis with active inflammation. C/w IV steroid. Plan for outpatient taper.

## 2022-07-19 NOTE — DISCHARGE NOTE PROVIDER - NSDCFUADDAPPT_GEN_ALL_CORE_FT
Please follow up with Dr. Hillman (Neurology) on September 7th at 2PM to monitor your multiple sclerosis.    Please follow up with a primary care doctor to monitor your blood pressure. If you would like to continue care with Jonna Chavez please call the primary care clinic above. Please follow up with Dr. Hillman (Neurology) on September 7th at 2PM to monitor your multiple sclerosis.    Please follow up with a primary care doctor to monitor your blood pressure. If you would like to continue care with Jonna Chavez please either call   1) Dr. Patel's office or 2) the [facility] primary care clinic above.

## 2022-07-19 NOTE — DISCHARGE NOTE PROVIDER - NSDCCPCAREPLAN_GEN_ALL_CORE_FT
PRINCIPAL DISCHARGE DIAGNOSIS  Diagnosis: Blurry vision, right eye  Assessment and Plan of Treatment: You were taken care of by the Neurology team with concern that you had a flare up of multiple sclerosis. You were given intravenous steroids which improved some of your symptoms. MRI imaging of your brain and spinal cord were consistent with the diagnosis of multiple sclerosis.  *Please continue taking prednisone as prescribed to finish treating the flare up  *Please see Dr. Hillman (Neurology) in 1 month      SECONDARY DISCHARGE DIAGNOSES  Diagnosis: HTN (hypertension)  Assessment and Plan of Treatment: Your blood pressure was elevated while in the hospital. Part of this may be due to white coat syndrome and the stress of being hospitalized. However your blood pressure was high enough to the point where we needed to start an anti-hypertensive medication.  *Please continue taking lisinopril 5mg daily  *Please visit a primary care doctor to monitor your blood pressure     PRINCIPAL DISCHARGE DIAGNOSIS  Diagnosis: Blurry vision, right eye  Assessment and Plan of Treatment: You were taken care of by the Neurology team with concern that you had a flare up of multiple sclerosis. You were given intravenous steroids which improved some of your symptoms. MRI imaging of your brain and spinal cord were consistent with the diagnosis of multiple sclerosis.  *Please continue taking prednisone as prescribed to finish treating the flare up  *Please see Dr. Hillman (Neurology) on September 7th.      SECONDARY DISCHARGE DIAGNOSES  Diagnosis: HTN (hypertension)  Assessment and Plan of Treatment: Your blood pressure was elevated while in the hospital. Part of this may be due to white coat syndrome and the stress of being hospitalized. However your blood pressure was high enough to the point where we needed to start an anti-hypertensive medication.  *Please continue taking lisinopril 5mg daily  *Please visit a primary care doctor to monitor your blood pressure     PRINCIPAL DISCHARGE DIAGNOSIS  Diagnosis: Blurry vision, right eye  Assessment and Plan of Treatment: You were taken care of by the Neurology team with concern that you had a flare up of multiple sclerosis. You were given intravenous steroids which improved some of your symptoms. MRI imaging of your brain and spinal cord were consistent with the diagnosis of multiple sclerosis.  *Please continue taking prednisone as prescribed to finish treating the flare up:  1000mg on July 21 and 22.  60mg on July 23.  50mg on July 24.  40mg on July 25.  30mg on July 26.  20mg on July 27.  If any questions regarding the steroid medication please call the hospital.  *Please see Dr. Hillman (Neurology) on September 7th to monitor your multiple sclerosis.      SECONDARY DISCHARGE DIAGNOSES  Diagnosis: HTN (hypertension)  Assessment and Plan of Treatment: Your blood pressure was elevated while in the hospital. Part of this may be due to white coat syndrome and the stress of being hospitalized. However your blood pressure was high enough to the point where we needed to start an anti-hypertensive medication.  *Please continue taking lisinopril 5mg daily  *Please visit a primary care doctor to monitor your blood pressure     PRINCIPAL DISCHARGE DIAGNOSIS  Diagnosis: Blurry vision, right eye  Assessment and Plan of Treatment: You were taken care of by the Neurology team with concern that you had a flare up of multiple sclerosis. You were given intravenous steroids which improved some of your symptoms. MRI imaging of your brain and spinal cord were consistent with the diagnosis of multiple sclerosis.  *Please continue taking prednisone as prescribed to finish treating the flare up:  1000mg on July 21 and 22.  60mg on July 23.  50mg on July 24.  40mg on July 25.  30mg on July 26.  20mg on July 27.  *Please also take pantoprazole, once per day, for 1 week to protect your stomach while taking the steroids  If any questions regarding the steroid medication please call the hospital.  *Please see Dr. Hillman (Neurology) on September 7th to monitor your multiple sclerosis.      SECONDARY DISCHARGE DIAGNOSES  Diagnosis: HTN (hypertension)  Assessment and Plan of Treatment: Your blood pressure was elevated while in the hospital. Part of this may be due to white coat syndrome and the stress of being hospitalized. However your blood pressure was high enough to the point where we needed to start an anti-hypertensive medication.  *Please continue taking lisinopril 5mg daily  *Please visit a primary care doctor to monitor your blood pressure

## 2022-07-20 ENCOUNTER — TRANSCRIPTION ENCOUNTER (OUTPATIENT)
Age: 62
End: 2022-07-20

## 2022-07-20 VITALS
RESPIRATION RATE: 18 BRPM | DIASTOLIC BLOOD PRESSURE: 68 MMHG | HEART RATE: 74 BPM | TEMPERATURE: 98 F | SYSTOLIC BLOOD PRESSURE: 125 MMHG | OXYGEN SATURATION: 98 %

## 2022-07-20 LAB
ANION GAP SERPL CALC-SCNC: 8 MMOL/L — SIGNIFICANT CHANGE UP (ref 5–17)
BUN SERPL-MCNC: 17 MG/DL — SIGNIFICANT CHANGE UP (ref 7–23)
CALCIUM SERPL-MCNC: 8.8 MG/DL — SIGNIFICANT CHANGE UP (ref 8.4–10.5)
CHLORIDE SERPL-SCNC: 105 MMOL/L — SIGNIFICANT CHANGE UP (ref 96–108)
CO2 SERPL-SCNC: 25 MMOL/L — SIGNIFICANT CHANGE UP (ref 22–31)
CREAT SERPL-MCNC: 0.73 MG/DL — SIGNIFICANT CHANGE UP (ref 0.5–1.3)
EGFR: 93 ML/MIN/1.73M2 — SIGNIFICANT CHANGE UP
GLUCOSE BLDC GLUCOMTR-MCNC: 117 MG/DL — HIGH (ref 70–99)
GLUCOSE SERPL-MCNC: 126 MG/DL — HIGH (ref 70–99)
HCT VFR BLD CALC: 36.9 % — SIGNIFICANT CHANGE UP (ref 34.5–45)
HGB BLD-MCNC: 12.3 G/DL — SIGNIFICANT CHANGE UP (ref 11.5–15.5)
MCHC RBC-ENTMCNC: 29.9 PG — SIGNIFICANT CHANGE UP (ref 27–34)
MCHC RBC-ENTMCNC: 33.3 GM/DL — SIGNIFICANT CHANGE UP (ref 32–36)
MCV RBC AUTO: 89.8 FL — SIGNIFICANT CHANGE UP (ref 80–100)
NRBC # BLD: 0 /100 WBCS — SIGNIFICANT CHANGE UP (ref 0–0)
PLATELET # BLD AUTO: 216 K/UL — SIGNIFICANT CHANGE UP (ref 150–400)
POTASSIUM SERPL-MCNC: 3.9 MMOL/L — SIGNIFICANT CHANGE UP (ref 3.5–5.3)
POTASSIUM SERPL-SCNC: 3.9 MMOL/L — SIGNIFICANT CHANGE UP (ref 3.5–5.3)
RBC # BLD: 4.11 M/UL — SIGNIFICANT CHANGE UP (ref 3.8–5.2)
RBC # FLD: 14.8 % — HIGH (ref 10.3–14.5)
SODIUM SERPL-SCNC: 138 MMOL/L — SIGNIFICANT CHANGE UP (ref 135–145)
WBC # BLD: 17.09 K/UL — HIGH (ref 3.8–10.5)
WBC # FLD AUTO: 17.09 K/UL — HIGH (ref 3.8–10.5)

## 2022-07-20 PROCEDURE — 97162 PT EVAL MOD COMPLEX 30 MIN: CPT

## 2022-07-20 PROCEDURE — 99239 HOSP IP/OBS DSCHRG MGMT >30: CPT

## 2022-07-20 PROCEDURE — 83735 ASSAY OF MAGNESIUM: CPT

## 2022-07-20 PROCEDURE — 72142 MRI NECK SPINE W/DYE: CPT

## 2022-07-20 PROCEDURE — 70553 MRI BRAIN STEM W/O & W/DYE: CPT

## 2022-07-20 PROCEDURE — 93005 ELECTROCARDIOGRAM TRACING: CPT

## 2022-07-20 PROCEDURE — 72147 MRI CHEST SPINE W/DYE: CPT

## 2022-07-20 PROCEDURE — 82962 GLUCOSE BLOOD TEST: CPT

## 2022-07-20 PROCEDURE — A9585: CPT

## 2022-07-20 PROCEDURE — 99285 EMERGENCY DEPT VISIT HI MDM: CPT

## 2022-07-20 PROCEDURE — 87635 SARS-COV-2 COVID-19 AMP PRB: CPT

## 2022-07-20 PROCEDURE — 97161 PT EVAL LOW COMPLEX 20 MIN: CPT

## 2022-07-20 PROCEDURE — 85027 COMPLETE CBC AUTOMATED: CPT

## 2022-07-20 PROCEDURE — 80048 BASIC METABOLIC PNL TOTAL CA: CPT

## 2022-07-20 PROCEDURE — 36415 COLL VENOUS BLD VENIPUNCTURE: CPT

## 2022-07-20 PROCEDURE — 85025 COMPLETE CBC W/AUTO DIFF WBC: CPT

## 2022-07-20 RX ORDER — LISINOPRIL 2.5 MG/1
1 TABLET ORAL
Qty: 30 | Refills: 3
Start: 2022-07-20 | End: 2022-11-16

## 2022-07-20 RX ORDER — PANTOPRAZOLE SODIUM 20 MG/1
1 TABLET, DELAYED RELEASE ORAL
Qty: 7 | Refills: 0
Start: 2022-07-20 | End: 2022-07-26

## 2022-07-20 RX ADMIN — Medication 58 MILLIGRAM(S): at 06:25

## 2022-07-20 NOTE — DISCHARGE NOTE NURSING/CASE MANAGEMENT/SOCIAL WORK - NSDCFUADDAPPT_GEN_ALL_CORE_FT
Please follow up with Dr. Hillman (Neurology) on September 7th at 2PM to monitor your multiple sclerosis.    Please follow up with a primary care doctor to monitor your blood pressure. If you would like to continue care with Jonna Chavez please either call   1) Dr. Patel's office or 2) the [facility] primary care clinic above.

## 2022-07-20 NOTE — DISCHARGE NOTE NURSING/CASE MANAGEMENT/SOCIAL WORK - NSDCPEFALRISK_GEN_ALL_CORE
For information on Fall & Injury Prevention, visit: [email or web address] OR  [email or web address] OR  https://www.cdc.gov/steadi/patient.html

## 2022-07-20 NOTE — DISCHARGE NOTE NURSING/CASE MANAGEMENT/SOCIAL WORK - PATIENT PORTAL LINK FT
You can access the FollowMyHealth Patient Portal offered by [facility] by registering at the following website: [email or web address]. By joining [facility]’s FollowMyHealth portal, you will also be able to view your health information using other applications (apps) compatible with our system.

## 2022-07-21 ENCOUNTER — TRANSCRIPTION ENCOUNTER (OUTPATIENT)
Age: 62
End: 2022-07-21

## 2022-07-28 ENCOUNTER — APPOINTMENT (OUTPATIENT)
Dept: INTERNAL MEDICINE | Facility: CLINIC | Age: 62
End: 2022-07-28

## 2022-07-28 VITALS
HEART RATE: 91 BPM | DIASTOLIC BLOOD PRESSURE: 80 MMHG | BODY MASS INDEX: 29.57 KG/M2 | SYSTOLIC BLOOD PRESSURE: 132 MMHG | HEIGHT: 66 IN | WEIGHT: 184 LBS | TEMPERATURE: 97.9 F | OXYGEN SATURATION: 98 %

## 2022-07-28 DIAGNOSIS — H53.8 OTHER VISUAL DISTURBANCES: ICD-10-CM

## 2022-07-28 DIAGNOSIS — I10 ESSENTIAL (PRIMARY) HYPERTENSION: ICD-10-CM

## 2022-07-28 DIAGNOSIS — G35 MULTIPLE SCLEROSIS: ICD-10-CM

## 2022-07-28 DIAGNOSIS — L30.8 OTHER SPECIFIED DERMATITIS: ICD-10-CM

## 2022-07-28 PROCEDURE — 99204 OFFICE O/P NEW MOD 45 MIN: CPT | Mod: GC,25

## 2022-07-29 ENCOUNTER — NON-APPOINTMENT (OUTPATIENT)
Age: 62
End: 2022-07-29

## 2022-08-01 ENCOUNTER — NON-APPOINTMENT (OUTPATIENT)
Age: 62
End: 2022-08-01

## 2022-08-02 RX ORDER — LISINOPRIL 5 MG/1
5 TABLET ORAL DAILY
Qty: 3 | Refills: 1 | Status: ACTIVE | COMMUNITY
Start: 2022-07-28 | End: 1900-01-01

## 2022-09-07 ENCOUNTER — APPOINTMENT (OUTPATIENT)
Dept: NEUROLOGY | Facility: CLINIC | Age: 62
End: 2022-09-07

## 2022-09-07 ENCOUNTER — NON-APPOINTMENT (OUTPATIENT)
Age: 62
End: 2022-09-07

## 2022-09-07 VITALS
BODY MASS INDEX: 29.73 KG/M2 | HEART RATE: 83 BPM | HEIGHT: 66 IN | OXYGEN SATURATION: 99 % | TEMPERATURE: 97.3 F | WEIGHT: 185 LBS

## 2022-09-07 VITALS — DIASTOLIC BLOOD PRESSURE: 87 MMHG | SYSTOLIC BLOOD PRESSURE: 159 MMHG

## 2022-09-07 PROCEDURE — 99205 OFFICE O/P NEW HI 60 MIN: CPT

## 2022-09-07 NOTE — HISTORY OF PRESENT ILLNESS
[FreeTextEntry1] : Reason for consult: MS\par \par HPI: SHEELA LY is a 62 year old woman \par \par Works in security.\par ~2018 - woke up with BL numbness of first and seconds toes, lasted days, mild so didn't think much of it.\par 6/2022 - abrupt onset of numbness in both legs and numbness in her mid-back. R foot dragging. Went to [facility], then admitted to [facility], then discharged herself. \par Later 6/2022 - abrupt onset of R eye visual blurriness, was told by neuro-ophtho to go to the hospital. \par Had MRIs showing lesions. Received 3d of IVMP and 2d of HDOP. Was dx'd with MS. \par Since hospitalization, she has felt essentially back to normal.\par \par ROS/Current Sx:\par no numbness\par no gait difficulties\par occ tightness in mid back\par \par PMHX:\par HTN\par eczema as a child\par \par MEDS:\par lisinopril\par \par ALL: nkda\par \par SHx: no tob, occ etoh, no drugs. works as a \par \par FHx: no neuro, no MS, no AI.\par \par \par Vitals: hypertension, i advised her to discuss with pmd.\par \par \par Exam:\par \par AO3.  Normally conversant.  Follows commands, names, and repeats.  Good attention.\par \par PERRL, no APD, VFF, EOMI, no nystagmus, face symmetric, TUP at midline.\par \par Motor: \par                                                 R:                               L:\par Del                                           5                                5\par Bi                                              5                               5\par Tri                                            5                               5\par Wrist Extensors                      5                               5\par Finger abductors                    5                               5\par                                         5                               5 \par \par HF                                           5                               5\par KE                                           5                               5\par KF                                           5                               5\par DF                                           5                               5\par PF                                           5                               5\par \par Tone                                       R                               L\par UE                                          0                                0 \par LE                                          0                                0\par \par Sensory                                RUE                      LUE                 RLE                LLE     \par LT                                           +                            +                      +                   +\par Vib                                          +                            +                      +                   +\par JPS                                         +                            +                      +                   +\par PP                                         +                            +                      +                   +\par Temp                                     +                            +                      +                   +\par \par Reflexes:\par                                              R                             L                            \par Biceps                                  3                             3\par BR                                        3                           3\par Pat                                       3++                            3++\par AJ                                        3                            3\par \par TOES                                    F                            F\par \par \par Coordination:\par                                              R                             L                       \par FTN                                       0                             0 \par DILIP                                      0                            0\par HTS                                      0                             0 \par \par Other                                                                          \par  \par Gait: normal, can heel, toe, tandem\par \par                     Assistance: none\par \par \par MRI brain, C, and T 7/2022 - lesions predominantly around the R ventricle, but also 2 lesions in the medulla. The vast majority of these enhance. axial images do not clearly demonstrate enhancement of medullary lesions but there is likely some on sag contrast image. PV lesions do not have a classical arauz's fingers appearance. 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 Rapid recovery with IVMP. MRIs in 7/2022 demonstrated both PV, PF, and cord lesions but the vast majority, if not all, lesions take up contrast subtly. \par \par I recommended LP to evaluate for further evidence of MS. I recommended that if no further data arises supporting MS diagnosis, I would defer DMT and recommend surveillance MRIs.\par \par - lab work\par - pt will let me know if she is amenable to LP; she is deciding.\par - if pt not amenable to LP and lab work is unrevealing, pt will have MRI brain, C, and T w/ and wo repeat in 10/2022\par - RTC 3m after new MRIs, or sooner\par

## 2023-06-24 NOTE — H&P ADULT - PROBLEM SELECTOR PLAN 1
English CT lumbar spine significant for spinal stenosis L3-L4 and degenerative changes, however patient's symptoms are not localized to a specific spinal cord level. Concern for multilevel spinal stenosis, need to rule out cauda equina and spinal cord compression due to episode of bowel incontinence.    - MRI w/o contrast lumbar spine and pelvis  - consulted neurosurgery, appreciate recs  - gabapentin 300 mg tonight, see how patient tolerates  - for spinal stenosis after r/o of cord compression, consider antiinflammatory treatment such as steroid course, gabapentin 300 mg TID, and outpatient PT referral CT lumbar spine significant for spinal stenosis L3-L4 and degenerative changes, however patient's symptoms are not localized to a specific spinal cord level. Concern for multilevel spinal stenosis, need to rule out cauda equina and spinal cord compression due to episode of bowel incontinence. Other things lower on the differential to be considered include multiple sclerosis, transverse myelitis.     - MRI w/o contrast thoracic and lumbar spine  - consulted neurosurgery, appreciate recs  - gabapentin 300 mg tonight, see how patient tolerates, continue gabapentin 300 mg TID  - serial neuro exams q8, observe for any acute changes to neuro exam  - for spinal stenosis after r/o of cord compression, consider antiinflammatory treatment such as steroid course, gabapentin 300 mg TID, and outpatient PT referral  - if MRI negative, consider outpatient neurology work up
